# Patient Record
Sex: FEMALE | Race: WHITE | Employment: OTHER | URBAN - METROPOLITAN AREA
[De-identification: names, ages, dates, MRNs, and addresses within clinical notes are randomized per-mention and may not be internally consistent; named-entity substitution may affect disease eponyms.]

---

## 2022-05-11 ENCOUNTER — OFFICE VISIT (OUTPATIENT)
Dept: FAMILY MEDICINE CLINIC | Facility: CLINIC | Age: 57
End: 2022-05-11
Payer: COMMERCIAL

## 2022-05-11 ENCOUNTER — LAB ENCOUNTER (OUTPATIENT)
Dept: LAB | Facility: HOSPITAL | Age: 57
End: 2022-05-11
Attending: NURSE PRACTITIONER
Payer: COMMERCIAL

## 2022-05-11 ENCOUNTER — TELEPHONE (OUTPATIENT)
Dept: FAMILY MEDICINE CLINIC | Facility: CLINIC | Age: 57
End: 2022-05-11

## 2022-05-11 ENCOUNTER — HOSPITAL ENCOUNTER (OUTPATIENT)
Dept: GENERAL RADIOLOGY | Facility: HOSPITAL | Age: 57
Discharge: HOME | End: 2022-05-11
Attending: NURSE PRACTITIONER
Payer: COMMERCIAL

## 2022-05-11 VITALS
HEIGHT: 67 IN | OXYGEN SATURATION: 100 % | WEIGHT: 191 LBS | HEART RATE: 80 BPM | SYSTOLIC BLOOD PRESSURE: 110 MMHG | BODY MASS INDEX: 29.98 KG/M2 | DIASTOLIC BLOOD PRESSURE: 70 MMHG | TEMPERATURE: 97 F

## 2022-05-11 DIAGNOSIS — E03.8 OTHER SPECIFIED HYPOTHYROIDISM: ICD-10-CM

## 2022-05-11 DIAGNOSIS — Z98.84 S/P GASTRIC BYPASS: ICD-10-CM

## 2022-05-11 DIAGNOSIS — K44.9 HIATAL HERNIA WITH GASTROESOPHAGEAL REFLUX DISEASE AND ESOPHAGITIS: ICD-10-CM

## 2022-05-11 DIAGNOSIS — I25.10 CORONARY ARTERY DISEASE INVOLVING NATIVE CORONARY ARTERY OF NATIVE HEART WITHOUT ANGINA PECTORIS: ICD-10-CM

## 2022-05-11 DIAGNOSIS — Z01.812 PRE-OPERATIVE LABORATORY EXAMINATION: ICD-10-CM

## 2022-05-11 DIAGNOSIS — Z87.891 HISTORY OF TOBACCO ABUSE: ICD-10-CM

## 2022-05-11 DIAGNOSIS — I77.810 AORTIC ROOT DILATATION (HCC): ICD-10-CM

## 2022-05-11 DIAGNOSIS — Z01.818 PREOP EXAMINATION: ICD-10-CM

## 2022-05-11 DIAGNOSIS — I10 ESSENTIAL HYPERTENSION: ICD-10-CM

## 2022-05-11 DIAGNOSIS — M53.3 CHRONIC LEFT SI JOINT PAIN: ICD-10-CM

## 2022-05-11 DIAGNOSIS — Z01.812 PRE-OPERATIVE LABORATORY EXAMINATION: Primary | ICD-10-CM

## 2022-05-11 DIAGNOSIS — G89.29 CHRONIC LEFT SI JOINT PAIN: ICD-10-CM

## 2022-05-11 DIAGNOSIS — G89.4 CHRONIC PAIN SYNDROME: ICD-10-CM

## 2022-05-11 DIAGNOSIS — K21.00 HIATAL HERNIA WITH GASTROESOPHAGEAL REFLUX DISEASE AND ESOPHAGITIS: ICD-10-CM

## 2022-05-11 PROBLEM — M79.7 FIBROMYALGIA: Status: ACTIVE | Noted: 2022-05-11

## 2022-05-11 LAB
ALBUMIN SERPL-MCNC: 4.2 G/DL (ref 3.4–5)
ALBUMIN/GLOB SERPL: 1.2 {RATIO} (ref 1–2)
ALP LIVER SERPL-CCNC: 43 U/L
ALT SERPL-CCNC: 29 U/L
ANION GAP SERPL CALC-SCNC: 7 MMOL/L (ref 0–18)
ANTIBODY SCREEN: NEGATIVE
APTT PPP: 26.2 SECONDS (ref 23.3–35.6)
AST SERPL-CCNC: 21 U/L (ref 15–37)
BASOPHILS # BLD AUTO: 0.03 X10(3) UL (ref 0–0.2)
BASOPHILS NFR BLD AUTO: 0.4 %
BILIRUB SERPL-MCNC: 0.5 MG/DL (ref 0.1–2)
BILIRUB UR QL: NEGATIVE
BUN BLD-MCNC: 10 MG/DL (ref 7–18)
BUN/CREAT SERPL: 14.3 (ref 10–20)
CALCIUM BLD-MCNC: 9.2 MG/DL (ref 8.5–10.1)
CHLORIDE SERPL-SCNC: 106 MMOL/L (ref 98–112)
CO2 SERPL-SCNC: 25 MMOL/L (ref 21–32)
COLOR UR: YELLOW
CREAT BLD-MCNC: 0.7 MG/DL
DEPRECATED RDW RBC AUTO: 44.6 FL (ref 35.1–46.3)
EOSINOPHIL # BLD AUTO: 0.23 X10(3) UL (ref 0–0.7)
EOSINOPHIL NFR BLD AUTO: 3.2 %
ERYTHROCYTE [DISTWIDTH] IN BLOOD BY AUTOMATED COUNT: 12.2 % (ref 11–15)
FASTING STATUS PATIENT QL REPORTED: YES
GLOBULIN PLAS-MCNC: 3.6 G/DL (ref 2.8–4.4)
GLUCOSE BLD-MCNC: 83 MG/DL (ref 70–99)
GLUCOSE UR-MCNC: NEGATIVE MG/DL
HCT VFR BLD AUTO: 42.6 %
HGB BLD-MCNC: 13.8 G/DL
HGB UR QL STRIP.AUTO: NEGATIVE
IMM GRANULOCYTES # BLD AUTO: 0.02 X10(3) UL (ref 0–1)
IMM GRANULOCYTES NFR BLD: 0.3 %
INR BLD: 0.94 (ref 0.8–1.2)
KETONES UR-MCNC: NEGATIVE MG/DL
LYMPHOCYTES # BLD AUTO: 1.79 X10(3) UL (ref 1–4)
LYMPHOCYTES NFR BLD AUTO: 24.8 %
MCH RBC QN AUTO: 31.7 PG (ref 26–34)
MCHC RBC AUTO-ENTMCNC: 32.4 G/DL (ref 31–37)
MCV RBC AUTO: 97.9 FL
MONOCYTES # BLD AUTO: 0.4 X10(3) UL (ref 0.1–1)
MONOCYTES NFR BLD AUTO: 5.5 %
NEUTROPHILS # BLD AUTO: 4.74 X10 (3) UL (ref 1.5–7.7)
NEUTROPHILS # BLD AUTO: 4.74 X10(3) UL (ref 1.5–7.7)
NEUTROPHILS NFR BLD AUTO: 65.8 %
NITRITE UR QL STRIP.AUTO: NEGATIVE
OSMOLALITY SERPL CALC.SUM OF ELEC: 284 MOSM/KG (ref 275–295)
PH UR: 7 [PH] (ref 5–8)
PLATELET # BLD AUTO: 256 10(3)UL (ref 150–450)
POTASSIUM SERPL-SCNC: 3.9 MMOL/L (ref 3.5–5.1)
PROT SERPL-MCNC: 7.8 G/DL (ref 6.4–8.2)
PROT UR-MCNC: NEGATIVE MG/DL
PROTHROMBIN TIME: 12.7 SECONDS (ref 11.6–14.8)
RBC # BLD AUTO: 4.35 X10(6)UL
RH BLOOD TYPE: POSITIVE
RH BLOOD TYPE: POSITIVE
SODIUM SERPL-SCNC: 138 MMOL/L (ref 136–145)
SP GR UR STRIP: 1.01 (ref 1–1.03)
UROBILINOGEN UR STRIP-ACNC: <2
VIT C UR-MCNC: NEGATIVE MG/DL
WBC # BLD AUTO: 7.2 X10(3) UL (ref 4–11)
WBC #/AREA URNS AUTO: >50 /HPF

## 2022-05-11 PROCEDURE — 99203 OFFICE O/P NEW LOW 30 MIN: CPT | Performed by: FAMILY MEDICINE

## 2022-05-11 PROCEDURE — 86850 RBC ANTIBODY SCREEN: CPT

## 2022-05-11 PROCEDURE — 36415 COLL VENOUS BLD VENIPUNCTURE: CPT

## 2022-05-11 PROCEDURE — 3008F BODY MASS INDEX DOCD: CPT | Performed by: FAMILY MEDICINE

## 2022-05-11 PROCEDURE — 81001 URINALYSIS AUTO W/SCOPE: CPT

## 2022-05-11 PROCEDURE — 3074F SYST BP LT 130 MM HG: CPT | Performed by: FAMILY MEDICINE

## 2022-05-11 PROCEDURE — 80053 COMPREHEN METABOLIC PANEL: CPT

## 2022-05-11 PROCEDURE — 85610 PROTHROMBIN TIME: CPT

## 2022-05-11 PROCEDURE — 87086 URINE CULTURE/COLONY COUNT: CPT

## 2022-05-11 PROCEDURE — 71046 X-RAY EXAM CHEST 2 VIEWS: CPT | Performed by: NURSE PRACTITIONER

## 2022-05-11 PROCEDURE — 93010 ELECTROCARDIOGRAM REPORT: CPT | Performed by: NURSE PRACTITIONER

## 2022-05-11 PROCEDURE — 86900 BLOOD TYPING SEROLOGIC ABO: CPT

## 2022-05-11 PROCEDURE — 85025 COMPLETE CBC W/AUTO DIFF WBC: CPT

## 2022-05-11 PROCEDURE — 87081 CULTURE SCREEN ONLY: CPT

## 2022-05-11 PROCEDURE — 93005 ELECTROCARDIOGRAM TRACING: CPT

## 2022-05-11 PROCEDURE — 85730 THROMBOPLASTIN TIME PARTIAL: CPT

## 2022-05-11 PROCEDURE — 86901 BLOOD TYPING SEROLOGIC RH(D): CPT

## 2022-05-11 PROCEDURE — 3078F DIAST BP <80 MM HG: CPT | Performed by: FAMILY MEDICINE

## 2022-05-11 RX ORDER — DOCUSATE SODIUM 100 MG/1
100 CAPSULE, LIQUID FILLED ORAL DAILY PRN
COMMUNITY

## 2022-05-11 RX ORDER — LIOTHYRONINE SODIUM 5 UG/1
5 TABLET ORAL DAILY
COMMUNITY

## 2022-05-11 RX ORDER — METHOCARBAMOL 750 MG/1
750 TABLET, FILM COATED ORAL 2 TIMES DAILY
COMMUNITY

## 2022-05-11 RX ORDER — HYDROCODONE BITARTRATE AND ACETAMINOPHEN 10; 325 MG/1; MG/1
1 TABLET ORAL EVERY 6 HOURS PRN
COMMUNITY

## 2022-05-11 RX ORDER — MULTIVIT-MIN/IRON FUM/FOLIC AC 7.5 MG-4
1 TABLET ORAL DAILY
COMMUNITY

## 2022-05-11 RX ORDER — NEOMYCIN/POLYMYXIN B/PRAMOXINE 3.5-10K-1
3 CREAM (GRAM) TOPICAL DAILY
COMMUNITY

## 2022-05-11 RX ORDER — MONTELUKAST SODIUM 10 MG/1
10 TABLET ORAL NIGHTLY
COMMUNITY

## 2022-05-11 RX ORDER — CETIRIZINE HYDROCHLORIDE 10 MG/1
10 TABLET ORAL DAILY
COMMUNITY

## 2022-05-11 RX ORDER — SPIRONOLACTONE 100 MG/1
100 TABLET, FILM COATED ORAL DAILY
COMMUNITY

## 2022-05-11 RX ORDER — AMOXICILLIN 500 MG/1
CAPSULE ORAL
COMMUNITY

## 2022-05-11 RX ORDER — FAMOTIDINE 20 MG/1
20 TABLET, FILM COATED ORAL DAILY
COMMUNITY

## 2022-05-11 RX ORDER — LEVOTHYROXINE SODIUM 75 MCG
75 TABLET ORAL
COMMUNITY

## 2022-05-11 RX ORDER — AMLODIPINE BESYLATE 5 MG/1
7.5 TABLET ORAL DAILY
COMMUNITY

## 2022-05-11 NOTE — TELEPHONE ENCOUNTER
Surgery on 05/23/22, Left Sacroilliac Joint Fusion with Dr. Eve Toro @ [facility]    H&P- complete 05/11/22  Labs- Urine culture neg, MRSA neg, Alk Phos (43), all other labs WNL  EKG- Sinus bradycardia, low voltage-possible pulmonary disease  X-ray- WN    Cardiologist- Dr. Rishabh San  [phone]  [phone]  Appt 05/16/22  Cardiac Clearance sent to scanning 05/18/22  \"EKG is stable with no acute changes. Cardiac testing: There is no evidence of unstable coronary syndrome, decompensated heart failure, critical valvular heart disease, or electrical instability. Risk of Cardiovascular complications in non-prohibitive. Patient understands that no immunity from risk exists\"    Pain Management Dr. Oliver Michelle  [phone]  He is aware patient is having outpatient surgery and sent in pain meds for patient post-op for Norco Q4 then to taper, also Robaxin 2 tabs PRN    Spoke to PAT- patient requested US guided IV insertion due to being a difficult start. They made a note in her chart and patient will remind them before they start IV to get the machine.

## 2022-05-18 NOTE — TELEPHONE ENCOUNTER
Dr. Marika Duran, please review:    Labs- Urine culture neg, MRSA neg, Alk Phos (43), all other labs WNL  EKG- Sinus bradycardia, low voltage-possible pulmonary disease  X-ray- WNL    Cardiologist- Dr. Irma Heath  [phone]  [phone]  Appt 05/16/22  Cardiac Clearance scanned into [facility] 05/19/22  \"EKG is stable with no acute changes. Cardiac testing: There is no evidence of unstable coronary syndrome, decompensated heart failure, critical valvular heart disease, or electrical instability. Risk of Cardiovascular complications in non-prohibitive. Patient understands that no immunity from risk exists\"    Pain Management Dr. Lili Vazquez  [phone]  He is aware patient is having outpatient surgery and sent in pain meds for patient post-op for Norco Q4 then to taper, also Robaxin 2 tabs PRN    Spoke to PAT- patient requested US guided IV insertion due to being a difficult start. They made a note in her chart and patient will remind them before they start IV to get the machine. OK for surgery?

## 2022-05-20 ENCOUNTER — LAB ENCOUNTER (OUTPATIENT)
Dept: LAB | Facility: HOSPITAL | Age: 57
End: 2022-05-20
Attending: ORTHOPAEDIC SURGERY
Payer: COMMERCIAL

## 2022-05-20 DIAGNOSIS — Z01.818 PRE-OP TESTING: ICD-10-CM

## 2022-05-20 NOTE — TELEPHONE ENCOUNTER
Cardiac clearance noted. Chart and results reviewed and are acceptable for surgery. Pt is medically clear to proceed with surgery as planned.

## 2022-05-20 NOTE — TELEPHONE ENCOUNTER
Left message for patient with test results and that we received Cardiac Clx and it is scanned into her chart. She is clear for surgery and should call back if she has any questions. She will need to make sure she gets COVID test done.

## 2022-05-21 LAB — SARS-COV-2 RNA RESP QL NAA+PROBE: DETECTED

## 2022-06-20 NOTE — PAT NURSING NOTE
Patient states she had stuffy nose, sniffles when she tested positive for COVID om 05/21/22. She currently denies any current s/s of COVID.

## 2022-06-21 ENCOUNTER — LAB ENCOUNTER (OUTPATIENT)
Dept: LAB | Facility: HOSPITAL | Age: 57
End: 2022-06-21
Attending: ORTHOPAEDIC SURGERY
Payer: COMMERCIAL

## 2022-06-21 DIAGNOSIS — Z01.818 PREOP TESTING: ICD-10-CM

## 2022-06-21 DIAGNOSIS — Z01.810 PREOP CARDIOVASCULAR EXAM: ICD-10-CM

## 2022-06-21 LAB
ANTIBODY SCREEN: NEGATIVE
RH BLOOD TYPE: POSITIVE

## 2022-06-21 PROCEDURE — 86850 RBC ANTIBODY SCREEN: CPT

## 2022-06-21 PROCEDURE — 86900 BLOOD TYPING SEROLOGIC ABO: CPT

## 2022-06-21 PROCEDURE — 87641 MR-STAPH DNA AMP PROBE: CPT

## 2022-06-21 PROCEDURE — 86901 BLOOD TYPING SEROLOGIC RH(D): CPT

## 2022-06-22 LAB — MRSA DNA SPEC QL NAA+PROBE: NEGATIVE

## 2022-06-24 ENCOUNTER — HOSPITAL ENCOUNTER (OUTPATIENT)
Facility: HOSPITAL | Age: 57
LOS: 1 days | Discharge: HOME | End: 2022-06-25
Attending: ORTHOPAEDIC SURGERY | Admitting: ORTHOPAEDIC SURGERY
Payer: COMMERCIAL

## 2022-06-24 ENCOUNTER — APPOINTMENT (OUTPATIENT)
Dept: GENERAL RADIOLOGY | Facility: HOSPITAL | Age: 57
End: 2022-06-24
Attending: ORTHOPAEDIC SURGERY
Payer: COMMERCIAL

## 2022-06-24 ENCOUNTER — ANESTHESIA EVENT (OUTPATIENT)
Dept: SURGERY | Facility: HOSPITAL | Age: 57
End: 2022-06-24
Payer: COMMERCIAL

## 2022-06-24 ENCOUNTER — ANESTHESIA (OUTPATIENT)
Dept: SURGERY | Facility: HOSPITAL | Age: 57
End: 2022-06-24
Payer: COMMERCIAL

## 2022-06-24 DIAGNOSIS — Z01.818 PREOP TESTING: ICD-10-CM

## 2022-06-24 DIAGNOSIS — Z01.810 PREOP CARDIOVASCULAR EXAM: Primary | ICD-10-CM

## 2022-06-24 PROCEDURE — 76000 FLUOROSCOPY <1 HR PHYS/QHP: CPT | Performed by: ORTHOPAEDIC SURGERY

## 2022-06-24 PROCEDURE — BW11YZZ FLUOROSCOPY OF ABDOMEN AND PELVIS USING OTHER CONTRAST: ICD-10-PCS | Performed by: RADIOLOGY

## 2022-06-24 PROCEDURE — 0SG834Z FUSION OF LEFT SACROILIAC JOINT WITH INTERNAL FIXATION DEVICE, PERCUTANEOUS APPROACH: ICD-10-PCS | Performed by: ORTHOPAEDIC SURGERY

## 2022-06-24 PROCEDURE — 99214 OFFICE O/P EST MOD 30 MIN: CPT | Performed by: HOSPITALIST

## 2022-06-24 DEVICE — IMPLANTABLE DEVICE
Type: IMPLANTABLE DEVICE | Site: BACK | Status: FUNCTIONAL
Brand: IFUSE IMPLANT SYSTEM

## 2022-06-24 RX ORDER — NALOXONE HYDROCHLORIDE 0.4 MG/ML
80 INJECTION, SOLUTION INTRAMUSCULAR; INTRAVENOUS; SUBCUTANEOUS AS NEEDED
Status: DISCONTINUED | OUTPATIENT
Start: 2022-06-24 | End: 2022-06-24 | Stop reason: HOSPADM

## 2022-06-24 RX ORDER — NEOSTIGMINE METHYLSULFATE 1 MG/ML
INJECTION INTRAVENOUS AS NEEDED
Status: DISCONTINUED | OUTPATIENT
Start: 2022-06-24 | End: 2022-06-24 | Stop reason: SURG

## 2022-06-24 RX ORDER — LEVOTHYROXINE SODIUM 0.07 MG/1
75 TABLET ORAL
Status: DISCONTINUED | OUTPATIENT
Start: 2022-06-25 | End: 2022-06-25

## 2022-06-24 RX ORDER — POLYETHYLENE GLYCOL 3350 17 G/17G
17 POWDER, FOR SOLUTION ORAL DAILY PRN
Status: DISCONTINUED | OUTPATIENT
Start: 2022-06-24 | End: 2022-06-25

## 2022-06-24 RX ORDER — HYDROCODONE BITARTRATE AND ACETAMINOPHEN 10; 325 MG/1; MG/1
2 TABLET ORAL EVERY 4 HOURS PRN
Status: DISCONTINUED | OUTPATIENT
Start: 2022-06-24 | End: 2022-06-25

## 2022-06-24 RX ORDER — METHOCARBAMOL 750 MG/1
750 TABLET, FILM COATED ORAL 2 TIMES DAILY PRN
Status: DISCONTINUED | OUTPATIENT
Start: 2022-06-24 | End: 2022-06-25

## 2022-06-24 RX ORDER — ONDANSETRON 2 MG/ML
4 INJECTION INTRAMUSCULAR; INTRAVENOUS EVERY 4 HOURS PRN
Status: ACTIVE | OUTPATIENT
Start: 2022-06-24 | End: 2022-06-25

## 2022-06-24 RX ORDER — CEFAZOLIN SODIUM/WATER 2 G/20 ML
2 SYRINGE (ML) INTRAVENOUS ONCE
Status: COMPLETED | OUTPATIENT
Start: 2022-06-24 | End: 2022-06-24

## 2022-06-24 RX ORDER — ACETAMINOPHEN 325 MG/1
650 TABLET ORAL EVERY 4 HOURS PRN
Status: DISCONTINUED | OUTPATIENT
Start: 2022-06-24 | End: 2022-06-25

## 2022-06-24 RX ORDER — MIDAZOLAM HYDROCHLORIDE 1 MG/ML
INJECTION INTRAMUSCULAR; INTRAVENOUS AS NEEDED
Status: DISCONTINUED | OUTPATIENT
Start: 2022-06-24 | End: 2022-06-24 | Stop reason: SURG

## 2022-06-24 RX ORDER — DIPHENHYDRAMINE HYDROCHLORIDE 50 MG/ML
25 INJECTION INTRAMUSCULAR; INTRAVENOUS EVERY 4 HOURS PRN
Status: DISCONTINUED | OUTPATIENT
Start: 2022-06-24 | End: 2022-06-25

## 2022-06-24 RX ORDER — DIPHENHYDRAMINE HCL 25 MG
25 CAPSULE ORAL EVERY 4 HOURS PRN
Status: DISCONTINUED | OUTPATIENT
Start: 2022-06-24 | End: 2022-06-25

## 2022-06-24 RX ORDER — BUPIVACAINE HYDROCHLORIDE AND EPINEPHRINE 5; 5 MG/ML; UG/ML
INJECTION, SOLUTION PERINEURAL AS NEEDED
Status: DISCONTINUED | OUTPATIENT
Start: 2022-06-24 | End: 2022-06-24 | Stop reason: HOSPADM

## 2022-06-24 RX ORDER — ACETAMINOPHEN 500 MG
1000 TABLET ORAL ONCE
Status: DISCONTINUED | OUTPATIENT
Start: 2022-06-24 | End: 2022-06-24 | Stop reason: HOSPADM

## 2022-06-24 RX ORDER — SODIUM PHOSPHATE, DIBASIC AND SODIUM PHOSPHATE, MONOBASIC 7; 19 G/133ML; G/133ML
1 ENEMA RECTAL ONCE AS NEEDED
Status: DISCONTINUED | OUTPATIENT
Start: 2022-06-24 | End: 2022-06-25

## 2022-06-24 RX ORDER — HYDROCODONE BITARTRATE AND ACETAMINOPHEN 10; 325 MG/1; MG/1
1 TABLET ORAL EVERY 4 HOURS PRN
Status: DISCONTINUED | OUTPATIENT
Start: 2022-06-24 | End: 2022-06-25

## 2022-06-24 RX ORDER — HYDROMORPHONE HYDROCHLORIDE 1 MG/ML
0.6 INJECTION, SOLUTION INTRAMUSCULAR; INTRAVENOUS; SUBCUTANEOUS EVERY 5 MIN PRN
Status: DISCONTINUED | OUTPATIENT
Start: 2022-06-24 | End: 2022-06-24 | Stop reason: HOSPADM

## 2022-06-24 RX ORDER — HYDROMORPHONE HYDROCHLORIDE 1 MG/ML
0.2 INJECTION, SOLUTION INTRAMUSCULAR; INTRAVENOUS; SUBCUTANEOUS EVERY 2 HOUR PRN
Status: DISCONTINUED | OUTPATIENT
Start: 2022-06-24 | End: 2022-06-25

## 2022-06-24 RX ORDER — GLYCOPYRROLATE 0.2 MG/ML
INJECTION, SOLUTION INTRAMUSCULAR; INTRAVENOUS AS NEEDED
Status: DISCONTINUED | OUTPATIENT
Start: 2022-06-24 | End: 2022-06-24 | Stop reason: SURG

## 2022-06-24 RX ORDER — PROCHLORPERAZINE EDISYLATE 5 MG/ML
10 INJECTION INTRAMUSCULAR; INTRAVENOUS EVERY 6 HOURS PRN
Status: DISCONTINUED | OUTPATIENT
Start: 2022-06-24 | End: 2022-06-25

## 2022-06-24 RX ORDER — HYDROMORPHONE HYDROCHLORIDE 1 MG/ML
0.8 INJECTION, SOLUTION INTRAMUSCULAR; INTRAVENOUS; SUBCUTANEOUS EVERY 2 HOUR PRN
Status: DISCONTINUED | OUTPATIENT
Start: 2022-06-24 | End: 2022-06-25

## 2022-06-24 RX ORDER — SENNOSIDES 8.6 MG
17.2 TABLET ORAL NIGHTLY
Status: DISCONTINUED | OUTPATIENT
Start: 2022-06-24 | End: 2022-06-25

## 2022-06-24 RX ORDER — LIOTHYRONINE SODIUM 5 UG/1
5 TABLET ORAL DAILY
Status: DISCONTINUED | OUTPATIENT
Start: 2022-06-25 | End: 2022-06-25

## 2022-06-24 RX ORDER — HYDROMORPHONE HYDROCHLORIDE 1 MG/ML
0.4 INJECTION, SOLUTION INTRAMUSCULAR; INTRAVENOUS; SUBCUTANEOUS EVERY 5 MIN PRN
Status: DISCONTINUED | OUTPATIENT
Start: 2022-06-24 | End: 2022-06-24 | Stop reason: HOSPADM

## 2022-06-24 RX ORDER — CETIRIZINE HYDROCHLORIDE 10 MG/1
10 TABLET ORAL DAILY
Status: DISCONTINUED | OUTPATIENT
Start: 2022-06-25 | End: 2022-06-25

## 2022-06-24 RX ORDER — FAMOTIDINE 20 MG/1
20 TABLET, FILM COATED ORAL DAILY
Status: DISCONTINUED | OUTPATIENT
Start: 2022-06-24 | End: 2022-06-25

## 2022-06-24 RX ORDER — SPIRONOLACTONE 25 MG/1
100 TABLET ORAL DAILY
Status: DISCONTINUED | OUTPATIENT
Start: 2022-06-25 | End: 2022-06-25

## 2022-06-24 RX ORDER — LIDOCAINE HYDROCHLORIDE 40 MG/ML
SOLUTION TOPICAL AS NEEDED
Status: DISCONTINUED | OUTPATIENT
Start: 2022-06-24 | End: 2022-06-24 | Stop reason: SURG

## 2022-06-24 RX ORDER — BISACODYL 10 MG
10 SUPPOSITORY, RECTAL RECTAL
Status: DISCONTINUED | OUTPATIENT
Start: 2022-06-24 | End: 2022-06-25

## 2022-06-24 RX ORDER — SODIUM CHLORIDE, SODIUM LACTATE, POTASSIUM CHLORIDE, CALCIUM CHLORIDE 600; 310; 30; 20 MG/100ML; MG/100ML; MG/100ML; MG/100ML
INJECTION, SOLUTION INTRAVENOUS CONTINUOUS
Status: DISCONTINUED | OUTPATIENT
Start: 2022-06-24 | End: 2022-06-24 | Stop reason: HOSPADM

## 2022-06-24 RX ORDER — MORPHINE SULFATE 10 MG/ML
6 INJECTION, SOLUTION INTRAMUSCULAR; INTRAVENOUS EVERY 10 MIN PRN
Status: DISCONTINUED | OUTPATIENT
Start: 2022-06-24 | End: 2022-06-24 | Stop reason: HOSPADM

## 2022-06-24 RX ORDER — DOCUSATE SODIUM 100 MG/1
100 CAPSULE, LIQUID FILLED ORAL 2 TIMES DAILY
Status: DISCONTINUED | OUTPATIENT
Start: 2022-06-24 | End: 2022-06-25

## 2022-06-24 RX ORDER — MORPHINE SULFATE 4 MG/ML
2 INJECTION, SOLUTION INTRAMUSCULAR; INTRAVENOUS EVERY 10 MIN PRN
Status: DISCONTINUED | OUTPATIENT
Start: 2022-06-24 | End: 2022-06-24 | Stop reason: HOSPADM

## 2022-06-24 RX ORDER — DOCUSATE SODIUM 100 MG/1
100 CAPSULE, LIQUID FILLED ORAL DAILY PRN
Status: DISCONTINUED | OUTPATIENT
Start: 2022-06-24 | End: 2022-06-25

## 2022-06-24 RX ORDER — SODIUM CHLORIDE, SODIUM LACTATE, POTASSIUM CHLORIDE, CALCIUM CHLORIDE 600; 310; 30; 20 MG/100ML; MG/100ML; MG/100ML; MG/100ML
INJECTION, SOLUTION INTRAVENOUS CONTINUOUS
Status: DISCONTINUED | OUTPATIENT
Start: 2022-06-24 | End: 2022-06-25

## 2022-06-24 RX ORDER — HYDROMORPHONE HYDROCHLORIDE 1 MG/ML
0.2 INJECTION, SOLUTION INTRAMUSCULAR; INTRAVENOUS; SUBCUTANEOUS EVERY 5 MIN PRN
Status: DISCONTINUED | OUTPATIENT
Start: 2022-06-24 | End: 2022-06-24 | Stop reason: HOSPADM

## 2022-06-24 RX ORDER — HYDROMORPHONE HYDROCHLORIDE 1 MG/ML
0.4 INJECTION, SOLUTION INTRAMUSCULAR; INTRAVENOUS; SUBCUTANEOUS EVERY 2 HOUR PRN
Status: DISCONTINUED | OUTPATIENT
Start: 2022-06-24 | End: 2022-06-25

## 2022-06-24 RX ORDER — TIZANIDINE 2 MG/1
2 TABLET ORAL EVERY 8 HOURS PRN
Status: DISCONTINUED | OUTPATIENT
Start: 2022-06-24 | End: 2022-06-25

## 2022-06-24 RX ORDER — DIAZEPAM 5 MG/1
5 TABLET ORAL EVERY 6 HOURS PRN
Status: DISCONTINUED | OUTPATIENT
Start: 2022-06-24 | End: 2022-06-25

## 2022-06-24 RX ORDER — ROCURONIUM BROMIDE 10 MG/ML
INJECTION, SOLUTION INTRAVENOUS AS NEEDED
Status: DISCONTINUED | OUTPATIENT
Start: 2022-06-24 | End: 2022-06-24 | Stop reason: SURG

## 2022-06-24 RX ORDER — MORPHINE SULFATE 4 MG/ML
4 INJECTION, SOLUTION INTRAMUSCULAR; INTRAVENOUS EVERY 10 MIN PRN
Status: DISCONTINUED | OUTPATIENT
Start: 2022-06-24 | End: 2022-06-24 | Stop reason: HOSPADM

## 2022-06-24 RX ORDER — CEFAZOLIN SODIUM/WATER 2 G/20 ML
2 SYRINGE (ML) INTRAVENOUS EVERY 8 HOURS
Status: COMPLETED | OUTPATIENT
Start: 2022-06-24 | End: 2022-06-25

## 2022-06-24 RX ORDER — LIDOCAINE HYDROCHLORIDE 10 MG/ML
INJECTION, SOLUTION EPIDURAL; INFILTRATION; INTRACAUDAL; PERINEURAL AS NEEDED
Status: DISCONTINUED | OUTPATIENT
Start: 2022-06-24 | End: 2022-06-24 | Stop reason: SURG

## 2022-06-24 RX ORDER — MONTELUKAST SODIUM 10 MG/1
10 TABLET ORAL NIGHTLY
Status: DISCONTINUED | OUTPATIENT
Start: 2022-06-24 | End: 2022-06-25

## 2022-06-24 RX ADMIN — NEOSTIGMINE METHYLSULFATE 5 MG: 1 INJECTION INTRAVENOUS at 08:18:00

## 2022-06-24 RX ADMIN — MIDAZOLAM HYDROCHLORIDE 2 MG: 1 INJECTION INTRAMUSCULAR; INTRAVENOUS at 07:20:00

## 2022-06-24 RX ADMIN — SODIUM CHLORIDE, SODIUM LACTATE, POTASSIUM CHLORIDE, CALCIUM CHLORIDE: 600; 310; 30; 20 INJECTION, SOLUTION INTRAVENOUS at 07:20:00

## 2022-06-24 RX ADMIN — CEFAZOLIN SODIUM/WATER 2 G: 2 G/20 ML SYRINGE (ML) INTRAVENOUS at 07:45:00

## 2022-06-24 RX ADMIN — LIDOCAINE HYDROCHLORIDE 4 ML: 40 SOLUTION TOPICAL at 07:26:00

## 2022-06-24 RX ADMIN — LIDOCAINE HYDROCHLORIDE 50 MG: 10 INJECTION, SOLUTION EPIDURAL; INFILTRATION; INTRACAUDAL; PERINEURAL at 07:20:00

## 2022-06-24 RX ADMIN — ROCURONIUM BROMIDE 40 MG: 10 INJECTION, SOLUTION INTRAVENOUS at 07:23:00

## 2022-06-24 RX ADMIN — GLYCOPYRROLATE 0.8 MG: 0.2 INJECTION, SOLUTION INTRAMUSCULAR; INTRAVENOUS at 08:18:00

## 2022-06-24 NOTE — ANESTHESIA PROCEDURE NOTES
Peripheral IV  Date/Time: 6/24/2022 7:52 AM  Inserted by: Marily Galindo MD    Placement  Needle size: 20 G  Laterality: left  Location: hand  Site prep: alcohol  Technique: anatomical landmarks  Attempts: 2

## 2022-06-24 NOTE — ANESTHESIA PROCEDURE NOTES
Airway  Date/Time: 6/24/2022 7:26 AM  Urgency: elective      General Information and Staff    Patient location during procedure: OR  Anesthesiologist: Patricia Dumas MD  Performed: anesthesiologist     Indications and Patient Condition  Indications for airway management: anesthesia  Sedation level: deep  Preoxygenated: yes  Patient position: sniffing  Mask difficulty assessment: 1 - vent by mask    Final Airway Details  Final airway type: endotracheal airway      Successful airway: ETT  Cuffed: yes   Successful intubation technique: direct laryngoscopy  Endotracheal tube insertion site: oral  Blade: James  Blade size: #3  ETT size (mm): 7.0    Cormack-Lehane Classification: grade I - full view of glottis  Placement verified by: chest auscultation and capnometry   Measured from: lips  ETT to lips (cm): 21  Number of attempts at approach: 1    Additional Comments  Atraumatic x1

## 2022-06-24 NOTE — PLAN OF CARE
Pt is A&OX4, RA, patient has SCDs, patient pain being controlled with norco and valium. Patient is up with one and rolling walker. Patient voiding freely. Tolerating diet well. Plan is to go home tomorrow after clearances have been met. Patient stayed the day for pain management. Bed in lowest position and call light within reach.   Problem: Patient Centered Care  Goal: Patient preferences are identified and integrated in the patient's plan of care  Description: Interventions:  - What would you like us to know as we care for you?   - Provide timely, complete, and accurate information to patient/family  - Incorporate patient and family knowledge, values, beliefs, and cultural backgrounds into the planning and delivery of care  - Encourage patient/family to participate in care and decision-making at the level they choose  - Honor patient and family perspectives and choices  Outcome: Progressing     Problem: Patient/Family Goals  Goal: Patient/Family Long Term Goal  Description: Patient's Long Term Goal:     Interventions:  -   - See additional Care Plan goals for specific interventions  Outcome: Progressing  Goal: Patient/Family Short Term Goal  Description: Patient's Short Term Goal:     Interventions:   -   - See additional Care Plan goals for specific interventions  Outcome: Progressing     Problem: PAIN - ADULT  Goal: Verbalizes/displays adequate comfort level or patient's stated pain goal  Description: INTERVENTIONS:  - Encourage pt to monitor pain and request assistance  - Assess pain using appropriate pain scale  - Administer analgesics based on type and severity of pain and evaluate response  - Implement non-pharmacological measures as appropriate and evaluate response  - Consider cultural and social influences on pain and pain management  - Manage/alleviate anxiety  - Utilize distraction and/or relaxation techniques  - Monitor for opioid side effects  - Notify MD/LIP if interventions unsuccessful or patient reports new pain  - Anticipate increased pain with activity and pre-medicate as appropriate  Outcome: Progressing     Problem: RISK FOR INFECTION - ADULT  Goal: Absence of fever/infection during anticipated neutropenic period  Description: INTERVENTIONS  - Monitor WBC  - Administer growth factors as ordered  - Implement neutropenic guidelines  Outcome: Progressing     Problem: SAFETY ADULT - FALL  Goal: Free from fall injury  Description: INTERVENTIONS:  - Assess pt frequently for physical needs  - Identify cognitive and physical deficits and behaviors that affect risk of falls.   - [place] fall precautions as indicated by assessment.  - Educate pt/family on patient safety including physical limitations  - Instruct pt to call for assistance with activity based on assessment  - Modify environment to reduce risk of injury  - Provide assistive devices as appropriate  - Consider OT/PT consult to assist with strengthening/mobility  - Encourage toileting schedule  Outcome: Progressing     Problem: DISCHARGE PLANNING  Goal: Discharge to home or other facility with appropriate resources  Description: INTERVENTIONS:  - Identify barriers to discharge w/pt and caregiver  - Include patient/family/discharge partner in discharge planning  - Arrange for needed discharge resources and transportation as appropriate  - Identify discharge learning needs (meds, wound care, etc)  - Arrange for interpreters to assist at discharge as needed  - Consider post-discharge preferences of patient/family/discharge partner  - Complete POLST form as appropriate  - Assess patient's ability to be responsible for managing their own health  - Refer to Case Management Department for coordinating discharge planning if the patient needs post-hospital services based on physician/LIP order or complex needs related to functional status, cognitive ability or social support system  Outcome: Progressing

## 2022-06-25 ENCOUNTER — APPOINTMENT (OUTPATIENT)
Dept: GENERAL RADIOLOGY | Facility: HOSPITAL | Age: 57
End: 2022-06-25
Attending: PHYSICIAN ASSISTANT
Payer: COMMERCIAL

## 2022-06-25 VITALS
DIASTOLIC BLOOD PRESSURE: 66 MMHG | WEIGHT: 193 LBS | RESPIRATION RATE: 18 BRPM | HEIGHT: 67 IN | BODY MASS INDEX: 30.29 KG/M2 | HEART RATE: 63 BPM | OXYGEN SATURATION: 100 % | TEMPERATURE: 98 F | SYSTOLIC BLOOD PRESSURE: 105 MMHG

## 2022-06-25 LAB
DEPRECATED RDW RBC AUTO: 42.4 FL (ref 35.1–46.3)
ERYTHROCYTE [DISTWIDTH] IN BLOOD BY AUTOMATED COUNT: 11.9 % (ref 11–15)
HCT VFR BLD AUTO: 35 %
HGB BLD-MCNC: 11.5 G/DL
MCH RBC QN AUTO: 32 PG (ref 26–34)
MCHC RBC AUTO-ENTMCNC: 32.9 G/DL (ref 31–37)
MCV RBC AUTO: 97.5 FL
PLATELET # BLD AUTO: 209 10(3)UL (ref 150–450)
RBC # BLD AUTO: 3.59 X10(6)UL
WBC # BLD AUTO: 9.1 X10(3) UL (ref 4–11)

## 2022-06-25 PROCEDURE — 72100 X-RAY EXAM L-S SPINE 2/3 VWS: CPT | Performed by: PHYSICIAN ASSISTANT

## 2022-06-25 PROCEDURE — 99214 OFFICE O/P EST MOD 30 MIN: CPT | Performed by: HOSPITALIST

## 2022-06-25 RX ORDER — LIOTHYRONINE SODIUM 5 UG/1
5 TABLET ORAL
Status: DISCONTINUED | OUTPATIENT
Start: 2022-06-25 | End: 2022-06-25

## 2022-06-25 NOTE — PLAN OF CARE
Patient is alert and oriented. RA. SCDs on for DVT prophylaxis. Voiding freely. BM this AM. Dressing in place to left hip. Up x1 with walker. Patient NWB to LLE per her reports from MD, educated patient that she is WBAT. PRN norco and robaxin given for pain management. Tele in place. Right arm precautions. Gel ice packs. Call light within reach, bed alarm active.    Problem: Patient Centered Care  Goal: Patient preferences are identified and integrated in the patient's plan of care  Description: Interventions:  - Provide timely, complete, and accurate information to patient/family  - Incorporate patient and family knowledge, values, beliefs, and cultural backgrounds into the planning and delivery of care  - Encourage patient/family to participate in care and decision-making at the level they choose  - Honor patient and family perspectives and choices  Outcome: Progressing     Problem: PAIN - ADULT  Goal: Verbalizes/displays adequate comfort level or patient's stated pain goal  Description: INTERVENTIONS:  - Encourage pt to monitor pain and request assistance  - Assess pain using appropriate pain scale  - Administer analgesics based on type and severity of pain and evaluate response  - Implement non-pharmacological measures as appropriate and evaluate response  - Consider cultural and social influences on pain and pain management  - Manage/alleviate anxiety  - Utilize distraction and/or relaxation techniques  - Monitor for opioid side effects  - Notify MD/LIP if interventions unsuccessful or patient reports new pain  - Anticipate increased pain with activity and pre-medicate as appropriate  Outcome: Progressing     Problem: RISK FOR INFECTION - ADULT  Goal: Absence of fever/infection during anticipated neutropenic period  Description: INTERVENTIONS  - Monitor WBC  - Administer growth factors as ordered  - Implement neutropenic guidelines  Outcome: Progressing     Problem: SAFETY ADULT - FALL  Goal: Free from fall injury  Description: INTERVENTIONS:  - Assess pt frequently for physical needs  - Identify cognitive and physical deficits and behaviors that affect risk of falls.   - [place] fall precautions as indicated by assessment.  - Educate pt/family on patient safety including physical limitations  - Instruct pt to call for assistance with activity based on assessment  - Modify environment to reduce risk of injury  - Provide assistive devices as appropriate  - Consider OT/PT consult to assist with strengthening/mobility  - Encourage toileting schedule  Outcome: Progressing     Problem: DISCHARGE PLANNING  Goal: Discharge to home or other facility with appropriate resources  Description: INTERVENTIONS:  - Identify barriers to discharge w/pt and caregiver  - Include patient/family/discharge partner in discharge planning  - Arrange for needed discharge resources and transportation as appropriate  - Identify discharge learning needs (meds, wound care, etc)  - Arrange for interpreters to assist at discharge as needed  - Consider post-discharge preferences of patient/family/discharge partner  - Complete POLST form as appropriate  - Assess patient's ability to be responsible for managing their own health  - Refer to Case Management Department for coordinating discharge planning if the patient needs post-hospital services based on physician/LIP order or complex needs related to functional status, cognitive ability or social support system  Outcome: Progressing

## 2022-06-25 NOTE — DISCHARGE SUMMARY
Hospital Discharge Diagnoses: Chronic left SI joint    Lace+ Score: 16  59-90 High Risk  29-58 Medium Risk  0-28   Low Risk. TCM Follow-Up Recommendation:  LACE < 29: Low Risk of readmission after discharge from the hospital; Still recommend for TCM follow-up.     Please refer to dictated DC summary

## 2022-06-25 NOTE — PLAN OF CARE
Patient is alert and oriented. RA. SCDs on for DVT prophylaxis. Voiding freely. BM this AM. Dressing in place to left hip. Up x1 with walker. Patient NWB to LLE per her reports from MD, educated patient that she is WBAT. PRN norco and robaxin given for pain management. Tele in place. Right arm precautions. Gel ice packs. Call light within reach, bed alarm active. patient has clearances from surgical, medical, PT/OT to discharge today. Patient has medications at home already per her pain management team.  Discharge instructions gone over with patient.    Problem: Patient Centered Care  Goal: Patient preferences are identified and integrated in the patient's plan of care  Description: Interventions:  - What would you like us to know as we care for you?   - Provide timely, complete, and accurate information to patient/family  - Incorporate patient and family knowledge, values, beliefs, and cultural backgrounds into the planning and delivery of care  - Encourage patient/family to participate in care and decision-making at the level they choose  - Honor patient and family perspectives and choices  Outcome: Adequate for Discharge     Problem: Patient/Family Goals  Goal: Patient/Family Long Term Goal  Description: Patient's Long Term Goal:     Interventions:  -   - See additional Care Plan goals for specific interventions  Outcome: Adequate for Discharge  Goal: Patient/Family Short Term Goal  Description: Patient's Short Term Goal:     Interventions:   -   - See additional Care Plan goals for specific interventions  Outcome: Adequate for Discharge     Problem: PAIN - ADULT  Goal: Verbalizes/displays adequate comfort level or patient's stated pain goal  Description: INTERVENTIONS:  - Encourage pt to monitor pain and request assistance  - Assess pain using appropriate pain scale  - Administer analgesics based on type and severity of pain and evaluate response  - Implement non-pharmacological measures as appropriate and evaluate response  - Consider cultural and social influences on pain and pain management  - Manage/alleviate anxiety  - Utilize distraction and/or relaxation techniques  - Monitor for opioid side effects  - Notify MD/LIP if interventions unsuccessful or patient reports new pain  - Anticipate increased pain with activity and pre-medicate as appropriate  Outcome: Adequate for Discharge     Problem: RISK FOR INFECTION - ADULT  Goal: Absence of fever/infection during anticipated neutropenic period  Description: INTERVENTIONS  - Monitor WBC  - Administer growth factors as ordered  - Implement neutropenic guidelines  Outcome: Adequate for Discharge     Problem: SAFETY ADULT - FALL  Goal: Free from fall injury  Description: INTERVENTIONS:  - Assess pt frequently for physical needs  - Identify cognitive and physical deficits and behaviors that affect risk of falls.   - [place] fall precautions as indicated by assessment.  - Educate pt/family on patient safety including physical limitations  - Instruct pt to call for assistance with activity based on assessment  - Modify environment to reduce risk of injury  - Provide assistive devices as appropriate  - Consider OT/PT consult to assist with strengthening/mobility  - Encourage toileting schedule  Outcome: Adequate for Discharge     Problem: DISCHARGE PLANNING  Goal: Discharge to home or other facility with appropriate resources  Description: INTERVENTIONS:  - Identify barriers to discharge w/pt and caregiver  - Include patient/family/discharge partner in discharge planning  - Arrange for needed discharge resources and transportation as appropriate  - Identify discharge learning needs (meds, wound care, etc)  - Arrange for interpreters to assist at discharge as needed  - Consider post-discharge preferences of patient/family/discharge partner  - Complete POLST form as appropriate  - Assess patient's ability to be responsible for managing their own health  - Refer to Case Management Department for coordinating discharge planning if the patient needs post-hospital services based on physician/LIP order or complex needs related to functional status, cognitive ability or social support system  Outcome: Adequate for Discharge

## 2022-06-25 NOTE — PHYSICAL THERAPY NOTE
PHYSICAL THERAPY TREATMENT NOTE - INPATIENT     Room Number: 424/424-A       Presenting Problem: s/p left SI joint fusion 6/24/22       Problem List  Principal Problem:    Chronic left SI joint pain  Active Problems:    Other specified hypothyroidism    Essential hypertension      PHYSICAL THERAPY ASSESSMENT     Rn approved PT session and pt medicated, willing to work with therapist. Therapist donned with PPE; mask and gloved. Pt 's  with mask on and pt with mask on when amb outside of room. Instructed on importance to maintain back precautions with all mobility. Performing supine, gentle exs with ble's to promote functional ability. Pt able to recall back precautions and maintain them with mobiity. Supine to sit by log roll with SBA and sitting brad on EOB. Sit to stand with SBA and cues for WBAT on LLE. Pt was amb with weight acceptance on LLE to bathroom. Practice stoop negotiated: up/down with RW 2x with CGA, pt  present during PT session. Cont to amb outside of room, Pt amb with increased WBring on LE and provided cues for amb with upright posture. Pt taking 1 seated rest break and cont to amb with improve gait pattern. Pt requested to position in bed with ice pack on LB. All needs in reach and RN aware of pt progress and location. The patient's Approx Degree of Impairment: 35.83% has been calculated based on documentation in the [facility] '6 clicks' Inpatient Basic Mobility Short Form. Research supports that patients with this level of impairment may benefit from discharged to home. DISCHARGE RECOMMENDATIONS  PT Discharge Recommendations: Home     PLAN  PT Treatment Plan: Bed mobility; Body mechanics; Patient education;Gait training;Stoop training;Transfer training;Balance training    SUBJECTIVE  I just lie down with ice pack on LB.     OBJECTIVE  Precautions: Bed/chair alarm    WEIGHT BEARING RESTRICTION           L Lower Extremity: Weight Bearing as Tolerated (per Dr Josh Hicks PA, rn Mojgan Figueroa spoke with her via phone)    PAIN ASSESSMENT   Ratin  Location: surgical site  Management Techniques: Activity promotion; Body mechanics; Relaxation;Repositioning    BALANCE  Static Sitting: Good  Dynamic Sitting: Good  Static Standing: Fair +  Dynamic Standing: Fair    ACTIVITY TOLERANCE                          O2 WALK       AM-PAC '6-Clicks' INPATIENT SHORT FORM - BASIC MOBILITY  How much difficulty does the patient currently have. .. Patient Difficulty: Turning over in bed (including adjusting bedclothes, sheets and blankets)?: None   Patient Difficulty: Sitting down on and standing up from a chair with arms (e.g., wheelchair, bedside commode, etc.): None   Patient Difficulty: Moving from lying on back to sitting on the side of the bed?: A Little   How much help from another person does the patient currently need. .. Help from Another: Moving to and from a bed to a chair (including a wheelchair)?: A Little   Help from Another: Need to walk in hospital room?: A Little   Help from Another: Climbing 3-5 steps with a railing?: A Little     AM-PAC Score:  Raw Score: 20   Approx Degree of Impairment: 35.83%   Standardized Score (AM-PAC Scale): 47.67   CMS Modifier (G-Code): CJ    FUNCTIONAL ABILITY STATUS  Functional Mobility/Gait Assessment  Gait Assistance:  (SBA)  Distance (ft): 100ft  Assistive Device: Rolling walker  Pattern: L Decreased stance time  Stairs: Stoop/curb  Stoop/Curb: stoop up/down 2x with RW    Additional information:     THERAPEUTIC EXERCISES  Lower Extremity Ankle pumps  Heel raises  Quad sets     Position Supine       Patient End of Session: In bed;Needs met;Call light within reach;RN aware of session/findings; All patient questions and concerns addressed; Ice applied;SCDs in place; Family present    CURRENT GOALS     Goals to be met by: 22  Patient Goal Patient's self-stated goal is: to go home   Goal #1 Patient is able to demonstrate supine - sit EOB @ level: supervision     Goal #1   Current Status SBA by log roll   Goal #2 Patient is able to demonstrate transfers Sit to/from Stand at assistance level: supervision with walker - rolling     Goal #2  Current Status SBA from higher level of seat with RW   Goal #3 Patient is able to ambulate 30 feet with assist device: walker - rolling at assistance level: supervision   Goal #3   Current Status amb 100 ft with RW WBAT on LLE. Goal #4 Patient will negotiate one curb w/ assistive device and supervision   Goal #4   Current Status Curb negotiated 2x with using RW. Goal #5 Patient to demonstrate independence with home activity/exercise instructions provided to patient in preparation for discharge.    Goal #5   Current Status In progress   Goal #6    Goal #6  Current Status

## 2022-06-27 NOTE — DISCHARGE SUMMARY
[facility]    PATIENT'S NAME: Eugenia Epperson   ATTENDING PHYSICIAN: Shira Floyd MD   PATIENT ACCOUNT#:   [id number]    LOCATION:  [address] [place], [place] [address]eliot Hernandez #:   [id number]       YOB: 1965  ADMISSION DATE:       06/24/2022      DISCHARGE DATE:  06/25/2022    DISCHARGE SUMMARY    DISCHARGE DIAGNOSES:    1. Chronic left sacroiliac joint pain, present on admission. 2.   Hypothyroidism, present on admission. 3.   Hypertension, present on admission. 4.   Chronic pain, present on admission. HISTORY AND HOSPITAL COURSE:  The patient is a 79-year-old female with past medical history of chronic pain and chronic narcotic use, was admitted to the hospital.  She has had a history of chronic left SI joint pain. She is status post left sacroiliac joint fusion. She was admitted overnight for pain control as she does require heavy doses of narcotics. She was seen and examined this morning. She states her pain is controlled at this time and she has been deemed stable to be discharged home once evaluated by orthopedic surgery. All the above information was given to the patient. She verbalized understanding of information given. For details regarding the patient's hospitalization, please refer to patient's chart. PHYSICAL EXAMINATION:    GENERAL:  Currently patient lying in bed. Patient  appears to be in no acute distress at this time. She is A and O x3. VITAL SIGNS:   Reviewed from the patient's chart and currently stable. HEENT:  Extraocular movements are intact. Pupils equally round and reactive to light and accommodation. Atraumatic, normocephalic. LUNGS:  Good air entry bilaterally. CARDIAC:  S1, S2 appreciated. ABDOMEN:  Soft, nontender, nondistended. Positive bowel sounds. EXTREMITIES:  Peripheral pulses are positive. NEUROLOGICAL:  No focal neurological deficits noted at this time. LABORATORY DATA:  Labs are reviewed.      CONDITION ON DISCHARGE:  At this time, the patient is deemed stable to be discharged home to follow up with primary care physician as outpatient. Total discharge time was greater than 30 minutes.     Dictated By Marlene Lara MD  d: 06/25/2022 13:47:54  t: 06/26/2022 22:54:40  [facility] [id number]  [facility]/

## 2023-01-23 ENCOUNTER — TELEPHONE (OUTPATIENT)
Dept: FAMILY MEDICINE CLINIC | Facility: CLINIC | Age: 58
End: 2023-01-23

## 2023-01-23 RX ORDER — FAMOTIDINE 10 MG
20 TABLET ORAL 2 TIMES DAILY
COMMUNITY

## 2023-01-25 ENCOUNTER — EKG ENCOUNTER (OUTPATIENT)
Dept: LAB | Facility: HOSPITAL | Age: 58
End: 2023-01-25
Attending: FAMILY MEDICINE
Payer: COMMERCIAL

## 2023-01-25 ENCOUNTER — OFFICE VISIT (OUTPATIENT)
Dept: FAMILY MEDICINE CLINIC | Facility: CLINIC | Age: 58
End: 2023-01-25
Payer: COMMERCIAL

## 2023-01-25 VITALS
BODY MASS INDEX: 29.03 KG/M2 | RESPIRATION RATE: 16 BRPM | WEIGHT: 185 LBS | TEMPERATURE: 98 F | SYSTOLIC BLOOD PRESSURE: 94 MMHG | HEART RATE: 68 BPM | OXYGEN SATURATION: 98 % | HEIGHT: 67 IN | DIASTOLIC BLOOD PRESSURE: 62 MMHG

## 2023-01-25 DIAGNOSIS — Z01.818 PREOPERATIVE EXAMINATION, UNSPECIFIED: Primary | ICD-10-CM

## 2023-01-25 DIAGNOSIS — Z01.818 PREOPERATIVE EXAMINATION, UNSPECIFIED: ICD-10-CM

## 2023-01-25 DIAGNOSIS — M16.12 ARTHRITIS OF LEFT HIP: ICD-10-CM

## 2023-01-25 DIAGNOSIS — Z98.84 S/P GASTRIC BYPASS: ICD-10-CM

## 2023-01-25 DIAGNOSIS — E03.8 OTHER SPECIFIED HYPOTHYROIDISM: ICD-10-CM

## 2023-01-25 DIAGNOSIS — I77.810 AORTIC ROOT DILATATION (HCC): ICD-10-CM

## 2023-01-25 DIAGNOSIS — I25.10 CORONARY ARTERY DISEASE INVOLVING NATIVE CORONARY ARTERY OF NATIVE HEART WITHOUT ANGINA PECTORIS: ICD-10-CM

## 2023-01-25 DIAGNOSIS — Z01.812 PRE-OPERATIVE LABORATORY EXAMINATION: ICD-10-CM

## 2023-01-25 DIAGNOSIS — K44.9 HIATAL HERNIA WITH GASTROESOPHAGEAL REFLUX DISEASE AND ESOPHAGITIS: ICD-10-CM

## 2023-01-25 DIAGNOSIS — K21.00 HIATAL HERNIA WITH GASTROESOPHAGEAL REFLUX DISEASE AND ESOPHAGITIS: ICD-10-CM

## 2023-01-25 DIAGNOSIS — Z87.891 HISTORY OF TOBACCO ABUSE: ICD-10-CM

## 2023-01-25 DIAGNOSIS — I10 ESSENTIAL HYPERTENSION: ICD-10-CM

## 2023-01-25 DIAGNOSIS — G89.4 CHRONIC PAIN SYNDROME: ICD-10-CM

## 2023-01-25 LAB
ALBUMIN SERPL-MCNC: 4.2 G/DL (ref 3.4–5)
ALBUMIN/GLOB SERPL: 1.2 {RATIO} (ref 1–2)
ALP LIVER SERPL-CCNC: 47 U/L
ALT SERPL-CCNC: 34 U/L
ANION GAP SERPL CALC-SCNC: 8 MMOL/L (ref 0–18)
ANTIBODY SCREEN: NEGATIVE
AST SERPL-CCNC: 22 U/L (ref 15–37)
BASOPHILS # BLD AUTO: 0.02 X10(3) UL (ref 0–0.2)
BASOPHILS NFR BLD AUTO: 0.3 %
BILIRUB SERPL-MCNC: 0.5 MG/DL (ref 0.1–2)
BUN BLD-MCNC: 16 MG/DL (ref 7–18)
BUN/CREAT SERPL: 20 (ref 10–20)
CALCIUM BLD-MCNC: 9.3 MG/DL (ref 8.5–10.1)
CHLORIDE SERPL-SCNC: 103 MMOL/L (ref 98–112)
CO2 SERPL-SCNC: 27 MMOL/L (ref 21–32)
CREAT BLD-MCNC: 0.8 MG/DL
DEPRECATED RDW RBC AUTO: 42.4 FL (ref 35.1–46.3)
EOSINOPHIL # BLD AUTO: 0.08 X10(3) UL (ref 0–0.7)
EOSINOPHIL NFR BLD AUTO: 1.2 %
ERYTHROCYTE [DISTWIDTH] IN BLOOD BY AUTOMATED COUNT: 11.9 % (ref 11–15)
FASTING STATUS PATIENT QL REPORTED: YES
GFR SERPLBLD BASED ON 1.73 SQ M-ARVRAT: 85 ML/MIN/1.73M2 (ref 60–?)
GLOBULIN PLAS-MCNC: 3.4 G/DL (ref 2.8–4.4)
GLUCOSE BLD-MCNC: 82 MG/DL (ref 70–99)
HCT VFR BLD AUTO: 43.2 %
HGB BLD-MCNC: 13.9 G/DL
IMM GRANULOCYTES # BLD AUTO: 0.03 X10(3) UL (ref 0–1)
IMM GRANULOCYTES NFR BLD: 0.5 %
LYMPHOCYTES # BLD AUTO: 1.95 X10(3) UL (ref 1–4)
LYMPHOCYTES NFR BLD AUTO: 30 %
MCH RBC QN AUTO: 31.1 PG (ref 26–34)
MCHC RBC AUTO-ENTMCNC: 32.2 G/DL (ref 31–37)
MCV RBC AUTO: 96.6 FL
MONOCYTES # BLD AUTO: 0.46 X10(3) UL (ref 0.1–1)
MONOCYTES NFR BLD AUTO: 7.1 %
NEUTROPHILS # BLD AUTO: 3.97 X10 (3) UL (ref 1.5–7.7)
NEUTROPHILS # BLD AUTO: 3.97 X10(3) UL (ref 1.5–7.7)
NEUTROPHILS NFR BLD AUTO: 60.9 %
OSMOLALITY SERPL CALC.SUM OF ELEC: 286 MOSM/KG (ref 275–295)
PLATELET # BLD AUTO: 244 10(3)UL (ref 150–450)
POTASSIUM SERPL-SCNC: 3.9 MMOL/L (ref 3.5–5.1)
PROT SERPL-MCNC: 7.6 G/DL (ref 6.4–8.2)
RBC # BLD AUTO: 4.47 X10(6)UL
RH BLOOD TYPE: POSITIVE
SODIUM SERPL-SCNC: 138 MMOL/L (ref 136–145)
WBC # BLD AUTO: 6.5 X10(3) UL (ref 4–11)

## 2023-01-25 PROCEDURE — 86900 BLOOD TYPING SEROLOGIC ABO: CPT | Performed by: FAMILY MEDICINE

## 2023-01-25 PROCEDURE — 80053 COMPREHEN METABOLIC PANEL: CPT

## 2023-01-25 PROCEDURE — 93010 ELECTROCARDIOGRAM REPORT: CPT | Performed by: INTERNAL MEDICINE

## 2023-01-25 PROCEDURE — 86901 BLOOD TYPING SEROLOGIC RH(D): CPT | Performed by: FAMILY MEDICINE

## 2023-01-25 PROCEDURE — 36415 COLL VENOUS BLD VENIPUNCTURE: CPT | Performed by: FAMILY MEDICINE

## 2023-01-25 PROCEDURE — 87081 CULTURE SCREEN ONLY: CPT

## 2023-01-25 PROCEDURE — 93005 ELECTROCARDIOGRAM TRACING: CPT

## 2023-01-25 PROCEDURE — 86850 RBC ANTIBODY SCREEN: CPT | Performed by: FAMILY MEDICINE

## 2023-01-25 PROCEDURE — 85025 COMPLETE CBC W/AUTO DIFF WBC: CPT

## 2023-01-25 NOTE — TELEPHONE ENCOUNTER
Dr. Brice Salmeron, please review:    Labs- MRSA neg, all other labs WNL  EKG- sinus rhythm with occasional PCV, low voltage QRS, consider pulmonary disease, pericardial effusion, or normal variant, cannot rule out anterior infarct. Previous EKG done 06/11/22 in Cardio tab. X-ray- done 05/11/22 WNL    Cardiologist- Dr. Gonzalo Puckett  [phone]  [phone]  Cardiac Clx in Encounters 05/17/22- [facility]  \"EKG is stable with no acute changes. Cardiac testing: There is no evidence of unstable coronary syndrome, decompensated heart failure, critical valvular heart disease, or electrical instability. Risk of Cardiovascular complications in non-prohibitive. Patient understands that no immunity from risk exists\"    Pt getting Abd US at [facility] done on 01/30, checking on naval drainage. OK for surgery?

## 2023-01-26 LAB
ATRIAL RATE: 71 BPM
P-R INTERVAL: 196 MS
Q-T INTERVAL: 396 MS
QRS DURATION: 58 MS
QTC CALCULATION (BEZET): 430 MS
R AXIS: 32 DEGREES
T AXIS: 48 DEGREES
VENTRICULAR RATE: 71 BPM

## 2023-01-27 NOTE — DISCHARGE SUMMARY
Ortho Discharge Summary     Patient ID:  Jd Figueroa  [id number]  61year old  11/8/1965      Admit Date: (2/6/2023  7:23 AM     Discharge Date and Time: 2/7/2023 12:03 PM     Attending Physician: Perez Cintron MD     Reason for admission: L hip  DJD    Discharge Diagnoses: Left hip degenerative disease    Discharged Condition: stable    Hospital Course:  Routine  Medical course per internist     Consults: IP CONSULT TO CASE MANAGEMENT  IP CONSULT TO FAMILY/INTERNAL MED  IP CONSULT TO SOCIAL WORK  IP CONSULT TO SOCIAL WORK  IP CONSULT TO PAIN MANAGEMENT    Procedures: LTHA    Disposition: home    Patient Instructions: Activity: activity as tolerated and no driving for 2 weeks    Diet: regular diet     Wound Care: Aquacel Ag x 7 days then leave open to air if no drainage present. Glued incisions may get wet in the shower if no drainage present. Stapled/ draining incisions must be covered in Saran wrap when showering. Weight bearing status: weight bearing as tolerated    Follow-up with Dr. Kendra Harrison in 2 weeks.     DVT ppx: Aspirin

## 2023-01-31 NOTE — TELEPHONE ENCOUNTER
Spoke to patient, went over test results and let her know she is clear for surgery. She had the US of abdomen and MRI done 01/30/23 at [facility] but they have not resulted. Will send to THE [facility] when available to make sure all is ok still for surgery.

## 2023-02-01 NOTE — TELEPHONE ENCOUNTER
Spoke to patient, Dr. Vince Mcwilliams reviewed the abdominal US- in [facility] done 01/30/23. He said it basically is showing a cyst or collection of fluid where the fluid is leaking out of her belly button occasionally. He is OK with her proceeding to surgery with this finding. He suggested her following up with her PCP to get a repeat US in 6mo to a year to see if the size changes. If she wants to get a General Surgeon consult at some point she can do that too. Patient given Dr. Calle Paci office info. She was also told to watch the drainage coming from the belly button, if there is an odor or the fluid changes to yellow/green or pus she needs to get an antibiotic right away. Patient states understanding.

## 2023-02-03 ENCOUNTER — LAB ENCOUNTER (OUTPATIENT)
Dept: LAB | Age: 58
End: 2023-02-03
Attending: ORTHOPAEDIC SURGERY
Payer: COMMERCIAL

## 2023-02-03 DIAGNOSIS — Z01.818 PREOPERATIVE TESTING: ICD-10-CM

## 2023-02-04 LAB — SARS-COV-2 RNA RESP QL NAA+PROBE: NOT DETECTED

## 2023-02-06 ENCOUNTER — ANESTHESIA EVENT (OUTPATIENT)
Dept: SURGERY | Facility: HOSPITAL | Age: 58
End: 2023-02-06
Payer: COMMERCIAL

## 2023-02-06 ENCOUNTER — ANESTHESIA (OUTPATIENT)
Dept: SURGERY | Facility: HOSPITAL | Age: 58
End: 2023-02-06
Payer: COMMERCIAL

## 2023-02-06 ENCOUNTER — APPOINTMENT (OUTPATIENT)
Dept: GENERAL RADIOLOGY | Facility: HOSPITAL | Age: 58
End: 2023-02-06
Attending: PHYSICIAN ASSISTANT
Payer: COMMERCIAL

## 2023-02-06 ENCOUNTER — HOSPITAL ENCOUNTER (OUTPATIENT)
Facility: HOSPITAL | Age: 58
Discharge: HOME HEALTH | End: 2023-02-07
Attending: ORTHOPAEDIC SURGERY | Admitting: ORTHOPAEDIC SURGERY
Payer: COMMERCIAL

## 2023-02-06 DIAGNOSIS — Z01.818 PREOPERATIVE TESTING: Primary | ICD-10-CM

## 2023-02-06 PROBLEM — Z96.659 HISTORY OF REVISION OF TOTAL KNEE ARTHROPLASTY: Status: ACTIVE | Noted: 2023-02-06

## 2023-02-06 PROBLEM — Z96.642 STATUS POST LEFT HIP REPLACEMENT: Status: ACTIVE | Noted: 2023-02-06

## 2023-02-06 LAB
ALBUMIN SERPL-MCNC: 3.8 G/DL (ref 3.4–5)
ALBUMIN/GLOB SERPL: 1.3 {RATIO} (ref 1–2)
ALP LIVER SERPL-CCNC: 46 U/L
ALT SERPL-CCNC: 26 U/L
ANION GAP SERPL CALC-SCNC: 7 MMOL/L (ref 0–18)
AST SERPL-CCNC: 23 U/L (ref 15–37)
BILIRUB SERPL-MCNC: 0.2 MG/DL (ref 0.1–2)
BUN BLD-MCNC: 14 MG/DL (ref 7–18)
BUN/CREAT SERPL: 20 (ref 10–20)
CALCIUM BLD-MCNC: 8.9 MG/DL (ref 8.5–10.1)
CHLORIDE SERPL-SCNC: 107 MMOL/L (ref 98–112)
CO2 SERPL-SCNC: 24 MMOL/L (ref 21–32)
CREAT BLD-MCNC: 0.7 MG/DL
GFR SERPLBLD BASED ON 1.73 SQ M-ARVRAT: 100 ML/MIN/1.73M2 (ref 60–?)
GLOBULIN PLAS-MCNC: 3 G/DL (ref 2.8–4.4)
GLUCOSE BLD-MCNC: 95 MG/DL (ref 70–99)
HCT VFR BLD AUTO: 40.5 %
HGB BLD-MCNC: 13.1 G/DL
OSMOLALITY SERPL CALC.SUM OF ELEC: 286 MOSM/KG (ref 275–295)
POTASSIUM SERPL-SCNC: 4 MMOL/L (ref 3.5–5.1)
PROT SERPL-MCNC: 6.8 G/DL (ref 6.4–8.2)
SODIUM SERPL-SCNC: 138 MMOL/L (ref 136–145)

## 2023-02-06 PROCEDURE — 85018 HEMOGLOBIN: CPT | Performed by: PHYSICIAN ASSISTANT

## 2023-02-06 PROCEDURE — 85014 HEMATOCRIT: CPT | Performed by: PHYSICIAN ASSISTANT

## 2023-02-06 PROCEDURE — 88311 DECALCIFY TISSUE: CPT | Performed by: ORTHOPAEDIC SURGERY

## 2023-02-06 PROCEDURE — 97161 PT EVAL LOW COMPLEX 20 MIN: CPT

## 2023-02-06 PROCEDURE — 73502 X-RAY EXAM HIP UNI 2-3 VIEWS: CPT | Performed by: PHYSICIAN ASSISTANT

## 2023-02-06 PROCEDURE — 80053 COMPREHEN METABOLIC PANEL: CPT | Performed by: PHYSICIAN ASSISTANT

## 2023-02-06 PROCEDURE — 97530 THERAPEUTIC ACTIVITIES: CPT

## 2023-02-06 PROCEDURE — 88305 TISSUE EXAM BY PATHOLOGIST: CPT | Performed by: ORTHOPAEDIC SURGERY

## 2023-02-06 PROCEDURE — 0SRB0JA REPLACEMENT OF LEFT HIP JOINT WITH SYNTHETIC SUBSTITUTE, UNCEMENTED, OPEN APPROACH: ICD-10-PCS | Performed by: ORTHOPAEDIC SURGERY

## 2023-02-06 DEVICE — IMPLANTABLE DEVICE: Type: IMPLANTABLE DEVICE | Site: HIP | Status: FUNCTIONAL

## 2023-02-06 RX ORDER — ONDANSETRON 2 MG/ML
4 INJECTION INTRAMUSCULAR; INTRAVENOUS EVERY 6 HOURS PRN
Status: DISCONTINUED | OUTPATIENT
Start: 2023-02-06 | End: 2023-02-07

## 2023-02-06 RX ORDER — BISACODYL 10 MG
10 SUPPOSITORY, RECTAL RECTAL
Status: DISCONTINUED | OUTPATIENT
Start: 2023-02-06 | End: 2023-02-07

## 2023-02-06 RX ORDER — DOCUSATE SODIUM 100 MG/1
100 CAPSULE, LIQUID FILLED ORAL 2 TIMES DAILY
Status: DISCONTINUED | OUTPATIENT
Start: 2023-02-06 | End: 2023-02-07

## 2023-02-06 RX ORDER — MONTELUKAST SODIUM 10 MG/1
10 TABLET ORAL NIGHTLY
Status: DISCONTINUED | OUTPATIENT
Start: 2023-02-06 | End: 2023-02-07

## 2023-02-06 RX ORDER — POLYETHYLENE GLYCOL 3350 17 G/17G
17 POWDER, FOR SOLUTION ORAL DAILY PRN
Status: DISCONTINUED | OUTPATIENT
Start: 2023-02-06 | End: 2023-02-07

## 2023-02-06 RX ORDER — PROCHLORPERAZINE EDISYLATE 5 MG/ML
5 INJECTION INTRAMUSCULAR; INTRAVENOUS EVERY 8 HOURS PRN
Status: DISCONTINUED | OUTPATIENT
Start: 2023-02-06 | End: 2023-02-06 | Stop reason: HOSPADM

## 2023-02-06 RX ORDER — HYDROMORPHONE HYDROCHLORIDE 1 MG/ML
0.4 INJECTION, SOLUTION INTRAMUSCULAR; INTRAVENOUS; SUBCUTANEOUS EVERY 2 HOUR PRN
Status: DISCONTINUED | OUTPATIENT
Start: 2023-02-06 | End: 2023-02-07

## 2023-02-06 RX ORDER — FAMOTIDINE 10 MG/ML
20 INJECTION, SOLUTION INTRAVENOUS 2 TIMES DAILY
Status: DISCONTINUED | OUTPATIENT
Start: 2023-02-06 | End: 2023-02-07

## 2023-02-06 RX ORDER — SENNOSIDES 8.6 MG
17.2 TABLET ORAL NIGHTLY
Status: DISCONTINUED | OUTPATIENT
Start: 2023-02-06 | End: 2023-02-07

## 2023-02-06 RX ORDER — ASPIRIN 81 MG/1
81 TABLET ORAL 2 TIMES DAILY
Qty: 60 TABLET | Refills: 0 | Status: SHIPPED | OUTPATIENT
Start: 2023-02-06 | End: 2023-03-08

## 2023-02-06 RX ORDER — CETIRIZINE HYDROCHLORIDE 10 MG/1
10 TABLET ORAL NIGHTLY
Status: DISCONTINUED | OUTPATIENT
Start: 2023-02-06 | End: 2023-02-07

## 2023-02-06 RX ORDER — HYDROMORPHONE HYDROCHLORIDE 1 MG/ML
0.6 INJECTION, SOLUTION INTRAMUSCULAR; INTRAVENOUS; SUBCUTANEOUS EVERY 2 HOUR PRN
Status: DISCONTINUED | OUTPATIENT
Start: 2023-02-06 | End: 2023-02-07

## 2023-02-06 RX ORDER — ONDANSETRON 2 MG/ML
4 INJECTION INTRAMUSCULAR; INTRAVENOUS EVERY 6 HOURS PRN
Status: DISCONTINUED | OUTPATIENT
Start: 2023-02-06 | End: 2023-02-06 | Stop reason: HOSPADM

## 2023-02-06 RX ORDER — BUPIVACAINE HYDROCHLORIDE 7.5 MG/ML
INJECTION, SOLUTION INTRASPINAL
Status: COMPLETED | OUTPATIENT
Start: 2023-02-06 | End: 2023-02-06

## 2023-02-06 RX ORDER — MORPHINE SULFATE 10 MG/ML
6 INJECTION, SOLUTION INTRAMUSCULAR; INTRAVENOUS EVERY 10 MIN PRN
Status: DISCONTINUED | OUTPATIENT
Start: 2023-02-06 | End: 2023-02-06 | Stop reason: HOSPADM

## 2023-02-06 RX ORDER — HYDROMORPHONE HYDROCHLORIDE 1 MG/ML
0.2 INJECTION, SOLUTION INTRAMUSCULAR; INTRAVENOUS; SUBCUTANEOUS EVERY 2 HOUR PRN
Status: DISCONTINUED | OUTPATIENT
Start: 2023-02-06 | End: 2023-02-06

## 2023-02-06 RX ORDER — CEFAZOLIN SODIUM/WATER 2 G/20 ML
2 SYRINGE (ML) INTRAVENOUS EVERY 8 HOURS
Status: COMPLETED | OUTPATIENT
Start: 2023-02-06 | End: 2023-02-07

## 2023-02-06 RX ORDER — CEFADROXIL 500 MG/1
500 CAPSULE ORAL 2 TIMES DAILY
Qty: 14 CAPSULE | Refills: 0 | Status: SHIPPED | OUTPATIENT
Start: 2023-02-06 | End: 2023-02-13

## 2023-02-06 RX ORDER — DIPHENHYDRAMINE HCL 25 MG
25 CAPSULE ORAL EVERY 4 HOURS PRN
Status: DISCONTINUED | OUTPATIENT
Start: 2023-02-06 | End: 2023-02-07

## 2023-02-06 RX ORDER — ASPIRIN 81 MG/1
81 TABLET ORAL 2 TIMES DAILY
Status: DISCONTINUED | OUTPATIENT
Start: 2023-02-06 | End: 2023-02-06

## 2023-02-06 RX ORDER — HYDROCODONE BITARTRATE AND ACETAMINOPHEN 10; 325 MG/1; MG/1
1 TABLET ORAL EVERY 4 HOURS PRN
Status: DISCONTINUED | OUTPATIENT
Start: 2023-02-06 | End: 2023-02-06

## 2023-02-06 RX ORDER — ACETAMINOPHEN 500 MG
1000 TABLET ORAL ONCE
Status: DISCONTINUED | OUTPATIENT
Start: 2023-02-06 | End: 2023-02-06 | Stop reason: HOSPADM

## 2023-02-06 RX ORDER — SODIUM CHLORIDE, SODIUM LACTATE, POTASSIUM CHLORIDE, CALCIUM CHLORIDE 600; 310; 30; 20 MG/100ML; MG/100ML; MG/100ML; MG/100ML
INJECTION, SOLUTION INTRAVENOUS CONTINUOUS
Status: DISCONTINUED | OUTPATIENT
Start: 2023-02-06 | End: 2023-02-06 | Stop reason: HOSPADM

## 2023-02-06 RX ORDER — OXYCODONE HYDROCHLORIDE 5 MG/1
10 TABLET ORAL ONCE
Status: DISCONTINUED | OUTPATIENT
Start: 2023-02-06 | End: 2023-02-06 | Stop reason: HOSPADM

## 2023-02-06 RX ORDER — HYDROMORPHONE HYDROCHLORIDE 1 MG/ML
0.4 INJECTION, SOLUTION INTRAMUSCULAR; INTRAVENOUS; SUBCUTANEOUS EVERY 5 MIN PRN
Status: DISCONTINUED | OUTPATIENT
Start: 2023-02-06 | End: 2023-02-06 | Stop reason: HOSPADM

## 2023-02-06 RX ORDER — PANTOPRAZOLE SODIUM 40 MG/1
40 TABLET, DELAYED RELEASE ORAL
Qty: 30 TABLET | Refills: 0 | Status: SHIPPED | OUTPATIENT
Start: 2023-02-06

## 2023-02-06 RX ORDER — TRANEXAMIC ACID 10 MG/ML
1000 INJECTION, SOLUTION INTRAVENOUS ONCE
Status: COMPLETED | OUTPATIENT
Start: 2023-02-06 | End: 2023-02-07

## 2023-02-06 RX ORDER — CEFAZOLIN SODIUM/WATER 2 G/20 ML
2 SYRINGE (ML) INTRAVENOUS ONCE
Status: COMPLETED | OUTPATIENT
Start: 2023-02-06 | End: 2023-02-06

## 2023-02-06 RX ORDER — ONDANSETRON 4 MG/1
4 TABLET, FILM COATED ORAL EVERY 8 HOURS PRN
Qty: 30 TABLET | Refills: 0 | Status: SHIPPED | OUTPATIENT
Start: 2023-02-06

## 2023-02-06 RX ORDER — DIPHENHYDRAMINE HYDROCHLORIDE 50 MG/ML
12.5 INJECTION INTRAMUSCULAR; INTRAVENOUS EVERY 4 HOURS PRN
Status: DISCONTINUED | OUTPATIENT
Start: 2023-02-06 | End: 2023-02-07

## 2023-02-06 RX ORDER — HYDROMORPHONE HYDROCHLORIDE 1 MG/ML
0.6 INJECTION, SOLUTION INTRAMUSCULAR; INTRAVENOUS; SUBCUTANEOUS EVERY 5 MIN PRN
Status: DISCONTINUED | OUTPATIENT
Start: 2023-02-06 | End: 2023-02-06 | Stop reason: HOSPADM

## 2023-02-06 RX ORDER — MIDAZOLAM HYDROCHLORIDE 1 MG/ML
INJECTION INTRAMUSCULAR; INTRAVENOUS AS NEEDED
Status: DISCONTINUED | OUTPATIENT
Start: 2023-02-06 | End: 2023-02-06 | Stop reason: SURG

## 2023-02-06 RX ORDER — SODIUM PHOSPHATE, DIBASIC AND SODIUM PHOSPHATE, MONOBASIC 7; 19 G/133ML; G/133ML
1 ENEMA RECTAL ONCE AS NEEDED
Status: DISCONTINUED | OUTPATIENT
Start: 2023-02-06 | End: 2023-02-07

## 2023-02-06 RX ORDER — MAGNESIUM HYDROXIDE 1200 MG/15ML
LIQUID ORAL CONTINUOUS PRN
Status: COMPLETED | OUTPATIENT
Start: 2023-02-06 | End: 2023-02-06

## 2023-02-06 RX ORDER — MORPHINE SULFATE 4 MG/ML
2 INJECTION, SOLUTION INTRAMUSCULAR; INTRAVENOUS EVERY 10 MIN PRN
Status: DISCONTINUED | OUTPATIENT
Start: 2023-02-06 | End: 2023-02-06 | Stop reason: HOSPADM

## 2023-02-06 RX ORDER — LIOTHYRONINE SODIUM 5 UG/1
5 TABLET ORAL DAILY
Status: DISCONTINUED | OUTPATIENT
Start: 2023-02-07 | End: 2023-02-07

## 2023-02-06 RX ORDER — SODIUM CHLORIDE, SODIUM LACTATE, POTASSIUM CHLORIDE, CALCIUM CHLORIDE 600; 310; 30; 20 MG/100ML; MG/100ML; MG/100ML; MG/100ML
INJECTION, SOLUTION INTRAVENOUS CONTINUOUS
Status: DISCONTINUED | OUTPATIENT
Start: 2023-02-06 | End: 2023-02-07

## 2023-02-06 RX ORDER — METHOCARBAMOL 750 MG/1
750 TABLET, FILM COATED ORAL 2 TIMES DAILY
Status: DISCONTINUED | OUTPATIENT
Start: 2023-02-06 | End: 2023-02-07

## 2023-02-06 RX ORDER — TRANEXAMIC ACID 10 MG/ML
INJECTION, SOLUTION INTRAVENOUS AS NEEDED
Status: DISCONTINUED | OUTPATIENT
Start: 2023-02-06 | End: 2023-02-06 | Stop reason: SURG

## 2023-02-06 RX ORDER — FAMOTIDINE 20 MG/1
20 TABLET, FILM COATED ORAL 2 TIMES DAILY
Status: DISCONTINUED | OUTPATIENT
Start: 2023-02-06 | End: 2023-02-07

## 2023-02-06 RX ORDER — NALOXONE HYDROCHLORIDE 0.4 MG/ML
80 INJECTION, SOLUTION INTRAMUSCULAR; INTRAVENOUS; SUBCUTANEOUS AS NEEDED
Status: DISCONTINUED | OUTPATIENT
Start: 2023-02-06 | End: 2023-02-06 | Stop reason: HOSPADM

## 2023-02-06 RX ORDER — PROCHLORPERAZINE EDISYLATE 5 MG/ML
5 INJECTION INTRAMUSCULAR; INTRAVENOUS EVERY 8 HOURS PRN
Status: DISCONTINUED | OUTPATIENT
Start: 2023-02-06 | End: 2023-02-07

## 2023-02-06 RX ORDER — POLYETHYLENE GLYCOL 3350 17 G/17G
17 POWDER, FOR SOLUTION ORAL 2 TIMES DAILY
Status: DISCONTINUED | OUTPATIENT
Start: 2023-02-06 | End: 2023-02-07

## 2023-02-06 RX ORDER — MEPERIDINE HYDROCHLORIDE 25 MG/ML
12.5 INJECTION INTRAMUSCULAR; INTRAVENOUS; SUBCUTANEOUS AS NEEDED
Status: DISCONTINUED | OUTPATIENT
Start: 2023-02-06 | End: 2023-02-06 | Stop reason: HOSPADM

## 2023-02-06 RX ORDER — ACETAMINOPHEN 325 MG/1
650 TABLET ORAL EVERY 6 HOURS PRN
Status: DISCONTINUED | OUTPATIENT
Start: 2023-02-06 | End: 2023-02-07

## 2023-02-06 RX ORDER — HYDROMORPHONE HYDROCHLORIDE 1 MG/ML
0.4 INJECTION, SOLUTION INTRAMUSCULAR; INTRAVENOUS; SUBCUTANEOUS EVERY 2 HOUR PRN
Status: DISCONTINUED | OUTPATIENT
Start: 2023-02-06 | End: 2023-02-06

## 2023-02-06 RX ORDER — DOCUSATE SODIUM 100 MG/1
100 CAPSULE, LIQUID FILLED ORAL 2 TIMES DAILY
Qty: 60 CAPSULE | Refills: 2 | Status: SHIPPED | OUTPATIENT
Start: 2023-02-06

## 2023-02-06 RX ORDER — HYDROCODONE BITARTRATE AND ACETAMINOPHEN 10; 325 MG/1; MG/1
2 TABLET ORAL EVERY 4 HOURS PRN
Status: DISCONTINUED | OUTPATIENT
Start: 2023-02-06 | End: 2023-02-07

## 2023-02-06 RX ORDER — FAMOTIDINE 20 MG/1
20 TABLET, FILM COATED ORAL 2 TIMES DAILY
Status: DISCONTINUED | OUTPATIENT
Start: 2023-02-06 | End: 2023-02-06

## 2023-02-06 RX ORDER — MORPHINE SULFATE 4 MG/ML
4 INJECTION, SOLUTION INTRAMUSCULAR; INTRAVENOUS EVERY 10 MIN PRN
Status: DISCONTINUED | OUTPATIENT
Start: 2023-02-06 | End: 2023-02-06 | Stop reason: HOSPADM

## 2023-02-06 RX ORDER — DOCUSATE SODIUM 100 MG/1
100 CAPSULE, LIQUID FILLED ORAL DAILY PRN
Status: DISCONTINUED | OUTPATIENT
Start: 2023-02-06 | End: 2023-02-07

## 2023-02-06 RX ORDER — HYDROMORPHONE HYDROCHLORIDE 1 MG/ML
0.1 INJECTION, SOLUTION INTRAMUSCULAR; INTRAVENOUS; SUBCUTANEOUS EVERY 2 HOUR PRN
Status: DISCONTINUED | OUTPATIENT
Start: 2023-02-06 | End: 2023-02-06

## 2023-02-06 RX ORDER — LEVOTHYROXINE SODIUM 0.07 MG/1
75 TABLET ORAL
Status: DISCONTINUED | OUTPATIENT
Start: 2023-02-07 | End: 2023-02-07

## 2023-02-06 RX ORDER — HYDROMORPHONE HYDROCHLORIDE 1 MG/ML
0.2 INJECTION, SOLUTION INTRAMUSCULAR; INTRAVENOUS; SUBCUTANEOUS EVERY 5 MIN PRN
Status: DISCONTINUED | OUTPATIENT
Start: 2023-02-06 | End: 2023-02-06 | Stop reason: HOSPADM

## 2023-02-06 RX ORDER — SPIRONOLACTONE 25 MG/1
100 TABLET ORAL DAILY
Status: DISCONTINUED | OUTPATIENT
Start: 2023-02-07 | End: 2023-02-07

## 2023-02-06 RX ORDER — HYDROMORPHONE HYDROCHLORIDE 1 MG/ML
0.2 INJECTION, SOLUTION INTRAMUSCULAR; INTRAVENOUS; SUBCUTANEOUS EVERY 2 HOUR PRN
Status: DISCONTINUED | OUTPATIENT
Start: 2023-02-06 | End: 2023-02-07

## 2023-02-06 RX ORDER — DIPHENHYDRAMINE HYDROCHLORIDE 50 MG/ML
25 INJECTION INTRAMUSCULAR; INTRAVENOUS ONCE AS NEEDED
Status: ACTIVE | OUTPATIENT
Start: 2023-02-06 | End: 2023-02-06

## 2023-02-06 RX ADMIN — MIDAZOLAM HYDROCHLORIDE 2 MG: 1 INJECTION INTRAMUSCULAR; INTRAVENOUS at 09:02:00

## 2023-02-06 RX ADMIN — BUPIVACAINE HYDROCHLORIDE 1.8 ML: 7.5 INJECTION, SOLUTION INTRASPINAL at 09:05:00

## 2023-02-06 RX ADMIN — SODIUM CHLORIDE, SODIUM LACTATE, POTASSIUM CHLORIDE, CALCIUM CHLORIDE: 600; 310; 30; 20 INJECTION, SOLUTION INTRAVENOUS at 10:56:00

## 2023-02-06 RX ADMIN — CEFAZOLIN SODIUM/WATER 2 G: 2 G/20 ML SYRINGE (ML) INTRAVENOUS at 09:15:00

## 2023-02-06 RX ADMIN — TRANEXAMIC ACID 1000 MG: 10 INJECTION, SOLUTION INTRAVENOUS at 09:43:00

## 2023-02-06 NOTE — ADDENDUM NOTE
Addendum  created 02/06/23 1558 by Erin Lynch MD    Order list changed, Pharmacy for encounter modified

## 2023-02-06 NOTE — ANESTHESIA PROCEDURE NOTES
Spinal Block    Date/Time: 2/6/2023 9:05 AM  Performed by: Mervat Torres MD  Authorized by: Mervat Torres MD       General Information and Staff    Start Time:  2/6/2023 9:05 AM  End Time:  2/6/2023 9:10 AM  Anesthesiologist:  Mervat Torres MD  Performed by:   Anesthesiologist  Preanesthetic Checklist: patient identified, IV checked, risks and benefits discussed, monitors and equipment checked, pre-op evaluation, timeout performed, anesthesia consent and sterile technique used      Procedure Details    Patient Position:  Sitting  Prep: ChloraPrep    Monitoring:  Cardiac monitor  Approach:  Midline  Location:  L3-4  Injection Technique:  Single-shot    Needle    Needle Type:  Pencil-tip  Needle Gauge:  24 G  Needle Length:  3.5 in    Assessment    Sensory Level:   Events: clear CSF, CSF aspirated, well tolerated and blood negative      Additional Comments

## 2023-02-06 NOTE — OPERATIVE REPORT
[address] [facility] [place], [facility] [address], [address]  OPERATIVE REPORT  PATIENT NAME: Hemal Zimmerman? MR#: [id number]? ATTENDING MD: Evelyn Billings? DATE OF PROCEDURE: 2/6/2023? PREOPERATIVE DIAGNOSIS: Degenerative Arthritis Left Hip? POSTOPERATIVE DIAGNOSIS: Degenerative Arthritis Left Hip? SECONDARY DIAGNOSIS: None? OPERATION PERFORMED: Left total hip arthroplasty? SURGEON: Harpal Unger? ASSISTANT(S): 1st: Gala Leal? YOB: 1965? ANESTHESIA: Spinal and Epidural?BLOOD LOSS: 200? FLUIDS: 1800? COMPLICATIONS: None? FINDINGS: Degenerative Arthritis? SPECIMENS: Left femoral head? IMPLANTS: ?Femoral Stem: DJO - Exprt Revision Distal Stem ([id number], Lot#: \[id number]\)? Femoral Head: DJO - Delta Ceramic Head (28mm, -3.5, Lot#: \[id number]\)? Acetabular Cup: DJO - EMPOWR Acetabular Revision Multi-Hole Cup with P2 Coating (Cementless, 52mm with dome hole pug, Lot#: \[id number]\)? DJO Surgical - Empor Dual Mobility (Metal Liner 52 Cup, 42 Poly Bearing, Lot#: \[id number]\) ? DJO Surgical - Emposer Dual Mobility (42mm Poly Bearing, Lot#: \[id number]\) ? DJO - Exprt Revision Proximal Body (65mm, Lateral Offset, Lot#: [id number]) ? ? ?BMI: 28.83  INDICATIONS: The patient is a 58-year-old woman whom has been having increasing pain and discomfort in her left hip. She was seen in the orthopedic clinic where radiographs demonstrated severe left hip degenerative arthritis. She has failed nonoperative measures and wishes to proceed with total hip arthroplasty. She is aware of the potential risks of surgery including but not limited to infection, bleeding, nerve damage, component loosening, failure to relieve her symptoms, loss of motion, persistent pain, instability, leg length inequality, and the need for additional surgery in the future.   She had extensive preoperative evaluation including intraarticular injection with complete resolution of symptoms. INTRAOPERATIVE FINDINGS: Intraoperatively, the patient demonstrated Degenerative Arthritis to her left hip. She had eburnated bone throughout the acetabulum as well as femoral head. She also demonstrated labral tears. Bone stock was appropriate for her stated age. However, she had a very wide endosteal femoral canal so that a modular tapered stem was utilized. A cementless acetabular and femoral component was able to be placed in stable position. PROCEDURE: The patient was admitted to the same day surgery program on Monday, February 6, 2023. Following proper identification in the preoperative holding area with confirmation of the above-stated procedure, the patient was brought to the main operating room suite. The above-stated procedure was confirmed. She received 2 grams of Cefazolin for antibiotic prophylaxis. The above stated anesthesia was administered. She was placed in the left lateral decubitus position. Care was taken to pad all bony prominences. The left lower extremity was prepped and draped in the usual sterile fashion. Following demonstration of adequate anesthesia, a surgical incision was made in the patient's left hip along the posterior border of the femur. The epidermis and dermis were incised down to the level of the subcutaneous fat. Blunt dissection was used to identify the underlying plane of the iliotibial band. The iliotibial band was split in line with the surgical incision, extending this proximally to the gluteus anderson fascia. This plane was retracted with the use of a Charnley bow. The posterior aspect of the abductors were identified. This was retracted anteriorly. The piriformis was identified and tagged. A posterior capsulotomy was made. The femoral head was dislocated posteriorly.  A femoral neck osteotomy was made approximately 3 mm proximal to the level of the lesser trochanter as determined from preoperative templating. Attention was turned to preparation of the acetabulum. The acetabulum was exposed with an anterior-superior as well as a posterior-inferior acetabular retractor. The cotyloid body was removed along with the acetabular labrum. Reaming was initiated, reaming sequentially to a final reaming of 52 mm. The 52 mm reamer engaged the anterior and posterior column as well as the superior dome. Subsequently, a 52 mm EMPOWR Acetabular Revision Multi-Hole Cup with P2 Coating acetabular component was placed in 45 degrees of verticality and 20 degrees of anteversion. This was seated and gave excellent initial stability. 2 Screws were used. Subsequently, a 52/42 dual mobility liner was inserted. Attention was now turned to the femur. The femur was exposed with a posterior femoral elevator. Intramedullary access was gained with a tapered reamer. Due to the size of the endosteal canal, a decision was made to use distal fixation. Reamers were used sequentially to ream to a final size of 22. Subsequently, a 22mm x 130 mm tapered stem was inserted. This gave excellent axial as well as rotational stability. The proximal body was prepared to accept a 65 mm lateral body. A trial reduction was performed with a 28/42 mm, -3.5 mm offset ball. The hip was reduced. It was brought through a stable range of motion without evidence of impingement or instability. Leg lengths appeared to be symmetric based on patellar height. The trial components were removed. A 65 mm Lateral offset body was placed in approimately 15 degrees of anterversion and a size 42/ 28 mm -3.5 mm offset polyethylene ball was attached. The hip was subsequently re-reduced. The anterior capsule was then reapproximated to the posterior capsule with interrupted #1 Vicryl suture. The piriformis was reapproximated to the posterior aspect of the abductors with interrupted #1 Vicryl suture.  The iliotibial band and gluteus anderson fascia were closed with interrupted #1 Vicryl suture. The subcutaneous tissue was closed with 3-0 Vicryl, and the skin was closed with Monocryl #3-0. The wounds were dressed with Aquacel and Medipore tape was applied. She was transferred to her previous hospital bed and brought to the recovery room in stable condition. All counts were correct x2. There appeared to be no intraoperative complications. Postoperatively, she will be weight bearing as tolerated. She will work with physical therapy. She will be on deep venous thrombosis prophlyaxis for the next four weeks and iraida-operative antibiotics for the next 24 hours. DICTATED BY: Mane Meyer? DATE: 2023-02-06? SIGNED: 2023-02-06

## 2023-02-06 NOTE — INTERVAL H&P NOTE
Pre-op Diagnosis: Left hip degenerative disease    The above referenced H&P was reviewed by WILL Reynoso on 2/6/2023, the patient was examined and no significant changes have occurred in the patient's condition since the H&P was performed. I discussed with the patient and/or legal representative the potential benefits, risks and side effects of this procedure; the likelihood of the patient achieving goals; and potential problems that might occur during recuperation. I discussed reasonable alternatives to the procedure, including risks, benefits and side effects related to the alternatives and risks related to not receiving this procedure. We will proceed with procedure as planned.     Yajaira Barclay PA-C  Physician Assistant for Dr. Jamie Baumann: [phone]  F: [phone]  C: [phone]

## 2023-02-07 VITALS
OXYGEN SATURATION: 100 % | RESPIRATION RATE: 18 BRPM | BODY MASS INDEX: 28.88 KG/M2 | HEART RATE: 86 BPM | SYSTOLIC BLOOD PRESSURE: 122 MMHG | HEIGHT: 67 IN | DIASTOLIC BLOOD PRESSURE: 78 MMHG | WEIGHT: 184 LBS | TEMPERATURE: 98 F

## 2023-02-07 PROCEDURE — 97110 THERAPEUTIC EXERCISES: CPT

## 2023-02-07 PROCEDURE — 97116 GAIT TRAINING THERAPY: CPT

## 2023-02-07 NOTE — DISCHARGE PLANNING
Patient cleared for discharge by ortho and medicine. After visit summary reviewed with patient and spouse; both verbalized understanding of all instructions. Personal belongings sent with patient (walker, gel ice packs provided), patient discharged to home in stable condition in care of spouse.

## 2023-02-07 NOTE — PLAN OF CARE
Pt is A&Ox4. RA. Tolerating general diet. Eliquis, Aldo bell, & scds for dvt prophylaxis. Right arm precautions. Voiding freely. PRN norco & Robaxin for pain management. Up by 1 assist with a walker. Abtx administered. Call light within reach frequent monitoring. Safety measures in place. When medically clear plan for Home with HH.      Problem: Patient Centered Care  Goal: Patient preferences are identified and integrated in the patient's plan of care  Description: Interventions:  - What would you like us to know as we care for you?  - Provide timely, complete, and accurate information to patient/family  - Incorporate patient and family knowledge, values, beliefs, and cultural backgrounds into the planning and delivery of care  - Encourage patient/family to participate in care and decision-making at the level they choose  - Honor patient and family perspectives and choices  Outcome: Progressing     Problem: PAIN - ADULT  Goal: Verbalizes/displays adequate comfort level or patient's stated pain goal  Description: INTERVENTIONS:  - Encourage pt to monitor pain and request assistance  - Assess pain using appropriate pain scale  - Administer analgesics based on type and severity of pain and evaluate response  - Implement non-pharmacological measures as appropriate and evaluate response  - Consider cultural and social influences on pain and pain management  - Manage/alleviate anxiety  - Utilize distraction and/or relaxation techniques  - Monitor for opioid side effects  - Notify MD/LIP if interventions unsuccessful or patient reports new pain  - Anticipate increased pain with activity and pre-medicate as appropriate  Outcome: Progressing     Problem: RISK FOR INFECTION - ADULT  Goal: Absence of fever/infection during anticipated neutropenic period  Description: INTERVENTIONS  - Monitor WBC  - Administer growth factors as ordered  - Implement neutropenic guidelines  Outcome: Progressing     Problem: SAFETY ADULT - FALL  Goal: Free from fall injury  Description: INTERVENTIONS:  - Assess pt frequently for physical needs  - Identify cognitive and physical deficits and behaviors that affect risk of falls.   - [place] fall precautions as indicated by assessment.  - Educate pt/family on patient safety including physical limitations  - Instruct pt to call for assistance with activity based on assessment  - Modify environment to reduce risk of injury  - Provide assistive devices as appropriate  - Consider OT/PT consult to assist with strengthening/mobility  - Encourage toileting schedule  Outcome: Progressing     Problem: DISCHARGE PLANNING  Goal: Discharge to home or other facility with appropriate resources  Description: INTERVENTIONS:  - Identify barriers to discharge w/pt and caregiver  - Include patient/family/discharge partner in discharge planning  - Arrange for needed discharge resources and transportation as appropriate  - Identify discharge learning needs (meds, wound care, etc)  - Arrange for interpreters to assist at discharge as needed  - Consider post-discharge preferences of patient/family/discharge partner  - Complete POLST form as appropriate  - Assess patient's ability to be responsible for managing their own health  - Refer to Case Management Department for coordinating discharge planning if the patient needs post-hospital services based on physician/LIP order or complex needs related to functional status, cognitive ability or social support system  Outcome: Progressing

## 2023-02-07 NOTE — PHYSICAL THERAPY NOTE
PHYSICAL THERAPY TREATMENT NOTE - INPATIENT     Room Number: 427/427-A       Presenting Problem: s/p left FERNANDA 2/6/23    Problem List  Principal Problem:    Arthritis of left hip  Active Problems:    History of tobacco abuse    Aortic root dilatation (HCC)    Other specified hypothyroidism    Hiatal hernia with gastroesophageal reflux disease and esophagitis    Essential hypertension    Status post left hip replacement      PHYSICAL THERAPY ASSESSMENT   Chart reviewed. RN  approved participation in physical therapy. PPE worn by therapist: mask, gloves and goggles. Patient was wearing a mask during session. Patient presented in bed with 6/10 pain. Patient with good  progress towards goals during this session. Education provided on Total Hip precautions, Weight bearing status, Physical therapy plan of care and physiological benefits of out of bed mobility. Patient with good carryover. Bed mobility: Min assist  Transfers: Min assist  Gait Assistance: Contact guard assist  Distance (ft): 2 x 100  Assistive Device: Rolling walker  Pattern: L Decreased stance time          Pt seen daily. Chart reviewed;RN approved pt participation in PT RX. Min a for bed mobility and transfer. EOB sitting balance activity with emphasis on core stabilization. Hip precautions reviewed;education;pt recall 3/3 spinal precautions. Family present;family education;all questions and concerns addressed. Pt amb 2 x 100 ft with RW and CGa;cuing for gait pattern as well as for postural awareness. Ther ex. Navigated 8 stairs with CGa. Patient was left in bedside chair at end of session with all needs in reach. The patient's Approx Degree of Impairment: 46.58% has been calculated based on documentation in the [facility] '6 clicks' Inpatient Basic Mobility Short Form. Research supports that patients with this level of impairment may benefit from Home with home health PT.  RN aware of patient status post session.     DISCHARGE RECOMMENDATIONS  PT Discharge Recommendations: Home with home health PT     PLAN  PT Treatment Plan: Bed mobility; Endurance; Energy conservation;Gait training    SUBJECTIVE  Pt reports being ready for PT RX    OBJECTIVE  Precautions: FERNANDA - posterior    WEIGHT BEARING RESTRICTION  Weight Bearing Restriction: L lower extremity           L Lower Extremity: Weight Bearing as Tolerated    PAIN ASSESSMENT   Ratin  Location: left hip  Management Techniques: Activity promotion    BALANCE                                                                                                                       Static Sitting: Good  Dynamic Sitting: Good           Static Standing: Poor +  Dynamic Standing: Poor +    ACTIVITY TOLERANCE                         O2 WALK       AM-PAC '6-Clicks' INPATIENT SHORT FORM - BASIC MOBILITY  How much difficulty does the patient currently have. .. Patient Difficulty: Turning over in bed (including adjusting bedclothes, sheets and blankets)?: A Little   Patient Difficulty: Sitting down on and standing up from a chair with arms (e.g., wheelchair, bedside commode, etc.): A Little   Patient Difficulty: Moving from lying on back to sitting on the side of the bed?: A Little   How much help from another person does the patient currently need. .. Help from Another: Moving to and from a bed to a chair (including a wheelchair)?: A Little   Help from Another: Need to walk in hospital room?: A Little   Help from Another: Climbing 3-5 steps with a railing?: A Little     AM-PAC Score:  Raw Score: 18   Approx Degree of Impairment: 46.58%   Standardized Score (AM-PAC Scale): 43.63   CMS Modifier (G-Code): CK      Additional information:     THERAPEUTIC EXERCISES  Lower Extremity Ankle pumps  Glut sets  Heel slides  Quad sets     Position Supine       Patient End of Session: Up in chair;Call light within reach;RN aware of session/findings;Bracing education provided per handout; All patient questions and concerns addressed    CURRENT GOALS Patient Goal Patient's self-stated goal is: to go home with home PT   Goal #1 Patient is able to demonstrate supine - sit EOB @ level: modified independent   Goal #1   Current Status Min a   Goal #2 Patient is able to demonstrate transfers Sit to/from Stand at assistance level: supervision     Goal #2  Current Status Min a   Goal #3 Patient is able to ambulate 150 feet with assistive device at assistance level: supervision   Goal #3   Current Status Pt amb 2 x 100 ft with RW and CGA   Goal #4 Patient will negotiate 2 stairs/one curb w/ assistive device and supervision   Goal #4   Current Status Navigated 8 stairs with CGA   Goal #5 Patient verbalizes and/or demonstrates all precautions and safety concerns independently   Goal #5   Current Status In progress   Goal #6 Patient independently performs home exercise program for ROM/strengthening per the instructions provided in preparation for discharge. Goal #6  Current Status In progress   Total PT session-30 min   -> gait 15 minutes and there ex-15 minutes.

## 2023-02-07 NOTE — CM/SW NOTE
Interval History: Patient seen and examined at bedside. IV fluids discontinued due to fluid overload.     Review of Systems   Unable to perform ROS: Mental status change     Objective:     Vital Signs (Most Recent):  Temp: 98.4 °F (36.9 °C) (06/21/17 1505)  Pulse: 74 (06/21/17 1506)  Resp: 16 (06/21/17 1505)  BP: (!) 164/73 (06/21/17 1505)  SpO2: 96 % (06/21/17 1505) Vital Signs (24h Range):  Temp:  [97.1 °F (36.2 °C)-98.4 °F (36.9 °C)] 98.4 °F (36.9 °C)  Pulse:  [52-80] 74  Resp:  [16-20] 16  SpO2:  [91 %-100 %] 96 %  BP: (132-183)/(60-82) 164/73     Weight: 102.1 kg (225 lb 1.4 oz)  Body mass index is 38.64 kg/m².    Intake/Output Summary (Last 24 hours) at 06/21/17 1854  Last data filed at 06/21/17 1254   Gross per 24 hour   Intake             1440 ml   Output                0 ml   Net             1440 ml      Physical Exam   Constitutional: She appears well-developed and well-nourished.   Cardiovascular: Normal rate, regular rhythm and normal heart sounds.  Exam reveals no gallop and no friction rub.    No murmur heard.  Pulmonary/Chest: Effort normal and breath sounds normal. No respiratory distress. She has no wheezes. She has no rales.   Abdominal: Soft. Bowel sounds are normal. She exhibits no distension. There is no tenderness.   Musculoskeletal: Normal range of motion.   Neurological: She is alert.   Follows commands. AO x 2 - person and place    Skin: Skin is warm and dry.       Significant Labs:   CBC:   Recent Labs  Lab 06/20/17  0426 06/21/17  0415   WBC 5.11 4.56   HGB 9.0* 8.2*   HCT 28.6* 26.2*    213     CMP:   Recent Labs  Lab 06/20/17  0426 06/21/17  0415    141   K 4.9 4.8   * 112*   CO2 21* 22*   GLU 97 102   BUN 22* 22*   CREATININE 1.3 1.6*   CALCIUM 8.3* 8.2*   PROT 5.9* 5.6*   ALBUMIN 2.6* 2.5*   BILITOT 0.3 0.2   ALKPHOS 94 87   AST 25 17   ALT 13 12   ANIONGAP 7* 7*   EGFRNONAA 45.3* 35.3*       Significant Imaging: I have reviewed all pertinent imaging results/findings  Saul spoke with pt re dc planning. Pt sts she will dc to home with her  who will be with her x 2weeks. Pt sts [facility] ref was already sent by MD office. SAUL called Marjan with [facility] [phone] to confirm. Per Marjan they don not need any documentaton at this time, pt is scheduled to be seen this Thursday. If additional info is needed Marjan will contact MD office. Plan  Home with [facility]. / to remain available for support and/or discharge planning.      Charu Duvall RN    Ext [phone] within the past 24 hours.

## 2023-02-07 NOTE — PLAN OF CARE
Problem: Patient Centered Care  Goal: Patient preferences are identified and integrated in the patient's plan of care  Description: Interventions:  - What would you like us to know as we care for you? I have had gastric bypass surgery 20 years ago and a stomach fustuIa repair recently, so I cannot have NSAIDS.  I would like to be medicated well for pain control.  - Provide timely, complete, and accurate information to patient/family  - Incorporate patient and family knowledge, values, beliefs, and cultural backgrounds into the planning and delivery of care  - Encourage patient/family to participate in care and decision-making at the level they choose  - Honor patient and family perspectives and choices  Outcome: Progressing     Problem: PAIN - ADULT  Goal: Verbalizes/displays adequate comfort level or patient's stated pain goal  Description: INTERVENTIONS:  - Encourage pt to monitor pain and request assistance  - Assess pain using appropriate pain scale  - Administer analgesics based on type and severity of pain and evaluate response  - Implement non-pharmacological measures as appropriate and evaluate response  - Consider cultural and social influences on pain and pain management  - Manage/alleviate anxiety  - Utilize distraction and/or relaxation techniques  - Monitor for opioid side effects  - Notify MD/LIP if interventions unsuccessful or patient reports new pain  - Anticipate increased pain with activity and pre-medicate as appropriate  Outcome: Progressing     Problem: RISK FOR INFECTION - ADULT  Goal: Absence of fever/infection during anticipated neutropenic period  Description: INTERVENTIONS  - Monitor WBC  - Administer growth factors as ordered  - Implement neutropenic guidelines  Outcome: Progressing     Problem: SAFETY ADULT - FALL  Goal: Free from fall injury  Description: INTERVENTIONS:  - Assess pt frequently for physical needs  - Identify cognitive and physical deficits and behaviors that affect risk of falls. - [place] fall precautions as indicated by assessment.  - Educate pt/family on patient safety including physical limitations  - Instruct pt to call for assistance with activity based on assessment  - Modify environment to reduce risk of injury  - Provide assistive devices as appropriate  - Consider OT/PT consult to assist with strengthening/mobility  - Encourage toileting schedule  Outcome: Progressing     Problem: DISCHARGE PLANNING  Goal: Discharge to home or other facility with appropriate resources  Description: INTERVENTIONS:  - Identify barriers to discharge w/pt and caregiver  - Include patient/family/discharge partner in discharge planning  - Arrange for needed discharge resources and transportation as appropriate  - Identify discharge learning needs (meds, wound care, etc)  - Arrange for interpreters to assist at discharge as needed  - Consider post-discharge preferences of patient/family/discharge partner  - Complete POLST form as appropriate  - Assess patient's ability to be responsible for managing their own health  - Refer to Case Management Department for coordinating discharge planning if the patient needs post-hospital services based on physician/LIP order or complex needs related to functional status, cognitive ability or social support system  Outcome: Progressing   Patient was able to walk in room and hallway. Voided up in the bathroom. Left hip dressing c/d/I. Pain clinic contacted and pain meds doses increased. Pain presently under control.

## 2024-01-24 ENCOUNTER — ORDER TRANSCRIPTION (OUTPATIENT)
Dept: ADMINISTRATIVE | Facility: HOSPITAL | Age: 59
End: 2024-01-24

## 2024-01-24 DIAGNOSIS — I25.10 CORONARY ARTERY DISEASE INVOLVING NATIVE CORONARY ARTERY WITHOUT ANGINA PECTORIS: ICD-10-CM

## 2024-01-24 DIAGNOSIS — R94.31 ABNORMAL EKG: Primary | ICD-10-CM

## 2024-01-24 DIAGNOSIS — I77.810 AORTIC ROOT DILATATION (HCC): ICD-10-CM

## 2024-02-23 NOTE — IMAGING NOTE
Pt verbalized understanding  regarding  instructions given in preparation for gated study procedure:  Check-in in the [facility] out-patient registration at 0845  Hold caffeine, decaf drink  and chocolate 12 hours prior. LIZA

## 2024-02-27 ENCOUNTER — HOSPITAL ENCOUNTER (OUTPATIENT)
Dept: CT IMAGING | Facility: HOSPITAL | Age: 59
Discharge: HOME | End: 2024-02-27
Attending: INTERNAL MEDICINE
Payer: MEDICARE

## 2024-02-27 VITALS — SYSTOLIC BLOOD PRESSURE: 108 MMHG | HEART RATE: 56 BPM | DIASTOLIC BLOOD PRESSURE: 68 MMHG | RESPIRATION RATE: 13 BRPM

## 2024-02-27 DIAGNOSIS — I77.810 AORTIC ROOT DILATATION (HCC): ICD-10-CM

## 2024-02-27 DIAGNOSIS — I25.10 CORONARY ARTERY DISEASE INVOLVING NATIVE CORONARY ARTERY WITHOUT ANGINA PECTORIS: ICD-10-CM

## 2024-02-27 DIAGNOSIS — R94.31 ABNORMAL EKG: ICD-10-CM

## 2024-02-27 PROCEDURE — 75574 CT ANGIO HRT W/3D IMAGE: CPT | Performed by: INTERNAL MEDICINE

## 2024-02-27 RX ORDER — NITROGLYCERIN 0.4 MG/1
TABLET SUBLINGUAL
Status: COMPLETED
Start: 2024-02-27 | End: 2024-02-27

## 2024-02-27 RX ADMIN — NITROGLYCERIN 0.4 MG: 0.4 TABLET SUBLINGUAL at 09:44:00

## 2024-02-27 NOTE — IMAGING NOTE
0910- Pt A+O x4. Procedure explained and questions answered.  Pt denies long-acting nitrates.  Pt ambulatory and assisted to CT table.  O2 at 2L NC.  See flowsheet for VS.    CT tech Tarazo     Contrast= 85  Saline= 62  Average HR= 54    Pt tolerated scan well.  Denies S/S of contrast reaction.  IV d/c'd at 0950.  Cath intact, bleeding controlled.  Pt ambulatory and escorted to changing room for discharge home.

## 2024-05-06 ENCOUNTER — TELEPHONE (OUTPATIENT)
Dept: FAMILY MEDICINE CLINIC | Facility: CLINIC | Age: 59
End: 2024-05-06

## 2024-05-06 NOTE — TELEPHONE ENCOUNTER
06/20/2024 ALIF L5-S1 w/Marcus at [facility]    Cardiologist- Dr. Garcia  [phone]  [phone]   for RN to call back, Card Clx form received but risk for surgery part was empty, they only commented on holding ASA.  05/10/24      Pulmonology: Giselle Rod (Last seen 02/15/2024)

## 2024-06-03 ENCOUNTER — TELEPHONE (OUTPATIENT)
Dept: FAMILY MEDICINE CLINIC | Facility: CLINIC | Age: 59
End: 2024-06-03

## 2024-06-03 NOTE — TELEPHONE ENCOUNTER
L5-S1 ALIF on 06/20/24 with Dr. Velazquez @ [facility]    H&P- completed 06/04/24  Labs- AST (39), MRSA neg, all other labs WNL  EKG- normal sinus rhythm, low voltage QRS, consider pulmonary disease, pericardial effusion, or normal variant. Possible inferior infarct. Previous EKG done 01/25/23 in Cardio tab.  X-ray- chronic wedging midthoracic vertebra and accentuated thoracic kyphosis.    Last saw JA 01/25/23    Cardio- Dr. Julia Luis  Clearance scanned into Media 06/04/24:  \"Ms. Baldwin is at acceptable risk for this procedure from a cardiac standpoint.\"

## 2024-06-04 ENCOUNTER — OFFICE VISIT (OUTPATIENT)
Dept: FAMILY MEDICINE CLINIC | Facility: CLINIC | Age: 59
End: 2024-06-04
Payer: MEDICARE

## 2024-06-04 ENCOUNTER — LAB ENCOUNTER (OUTPATIENT)
Dept: LAB | Facility: HOSPITAL | Age: 59
End: 2024-06-04
Attending: FAMILY MEDICINE
Payer: MEDICARE

## 2024-06-04 ENCOUNTER — HOSPITAL ENCOUNTER (OUTPATIENT)
Dept: GENERAL RADIOLOGY | Facility: HOSPITAL | Age: 59
Discharge: HOME | End: 2024-06-04
Attending: FAMILY MEDICINE
Payer: MEDICARE

## 2024-06-04 VITALS
WEIGHT: 198 LBS | SYSTOLIC BLOOD PRESSURE: 102 MMHG | HEIGHT: 67 IN | OXYGEN SATURATION: 98 % | DIASTOLIC BLOOD PRESSURE: 70 MMHG | HEART RATE: 100 BPM | BODY MASS INDEX: 31.08 KG/M2 | RESPIRATION RATE: 16 BRPM | TEMPERATURE: 97 F

## 2024-06-04 DIAGNOSIS — Z90.721 S/P HYSTERECTOMY WITH OOPHORECTOMY: ICD-10-CM

## 2024-06-04 DIAGNOSIS — Z98.84 S/P GASTRIC BYPASS: ICD-10-CM

## 2024-06-04 DIAGNOSIS — E03.8 OTHER SPECIFIED HYPOTHYROIDISM: ICD-10-CM

## 2024-06-04 DIAGNOSIS — Z96.642 STATUS POST LEFT HIP REPLACEMENT: ICD-10-CM

## 2024-06-04 DIAGNOSIS — M79.7 FIBROMYALGIA: ICD-10-CM

## 2024-06-04 DIAGNOSIS — Z87.891 HISTORY OF TOBACCO ABUSE: ICD-10-CM

## 2024-06-04 DIAGNOSIS — G89.4 CHRONIC PAIN SYNDROME: ICD-10-CM

## 2024-06-04 DIAGNOSIS — S00.06XS TICK BITE OF SCALP, SEQUELA: ICD-10-CM

## 2024-06-04 DIAGNOSIS — Z01.818 PREOPERATIVE EXAMINATION, UNSPECIFIED: ICD-10-CM

## 2024-06-04 DIAGNOSIS — K21.00 HIATAL HERNIA WITH GASTROESOPHAGEAL REFLUX DISEASE AND ESOPHAGITIS: ICD-10-CM

## 2024-06-04 DIAGNOSIS — Z01.812 PRE-OPERATIVE LABORATORY EXAMINATION: ICD-10-CM

## 2024-06-04 DIAGNOSIS — M47.812 CERVICAL SPONDYLOSIS: ICD-10-CM

## 2024-06-04 DIAGNOSIS — R06.02 SHORTNESS OF BREATH: ICD-10-CM

## 2024-06-04 DIAGNOSIS — Z86.69 HISTORY OF MIGRAINE: ICD-10-CM

## 2024-06-04 DIAGNOSIS — M48.062 SPINAL STENOSIS OF LUMBAR REGION WITH NEUROGENIC CLAUDICATION: Primary | ICD-10-CM

## 2024-06-04 DIAGNOSIS — I89.0 LYMPHEDEMA: ICD-10-CM

## 2024-06-04 DIAGNOSIS — Z90.710 S/P HYSTERECTOMY WITH OOPHORECTOMY: ICD-10-CM

## 2024-06-04 DIAGNOSIS — G89.29 CHRONIC LEFT SI JOINT PAIN: ICD-10-CM
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
## 2024-06-04 DIAGNOSIS — G47.33 OSA ON CPAP: ICD-10-CM

## 2024-06-04 DIAGNOSIS — K44.9 HIATAL HERNIA WITH GASTROESOPHAGEAL REFLUX DISEASE AND ESOPHAGITIS: ICD-10-CM

## 2024-06-04 DIAGNOSIS — F41.9 ANXIETY: ICD-10-CM

## 2024-06-04 DIAGNOSIS — I25.10 CORONARY ARTERY DISEASE INVOLVING NATIVE CORONARY ARTERY OF NATIVE HEART WITHOUT ANGINA PECTORIS: ICD-10-CM

## 2024-06-04 DIAGNOSIS — M53.3 CHRONIC LEFT SI JOINT PAIN: ICD-10-CM

## 2024-06-04 DIAGNOSIS — G50.0 TRIGEMINAL NEURALGIA: ICD-10-CM

## 2024-06-04 DIAGNOSIS — Z85.3 HISTORY OF BILATERAL BREAST CANCER: ICD-10-CM

## 2024-06-04 DIAGNOSIS — I10 ESSENTIAL HYPERTENSION: ICD-10-CM

## 2024-06-04 DIAGNOSIS — I77.810 AORTIC ROOT DILATATION (HCC): ICD-10-CM

## 2024-06-04 LAB
ALBUMIN SERPL-MCNC: 4.8 G/DL (ref 3.2–4.8)
ALBUMIN/GLOB SERPL: 1.8 {RATIO} (ref 1–2)
ALP LIVER SERPL-CCNC: 67 U/L
ALT SERPL-CCNC: 35 U/L
ANION GAP SERPL CALC-SCNC: 7 MMOL/L (ref 0–18)
ANTIBODY SCREEN: NEGATIVE
APTT PPP: 25.3 SECONDS (ref 23–36)
AST SERPL-CCNC: 39 U/L (ref ?–34)
ATRIAL RATE: 67 BPM
BASOPHILS # BLD AUTO: 0.01 X10(3) UL (ref 0–0.2)
BASOPHILS NFR BLD AUTO: 0.2 %
BILIRUB SERPL-MCNC: 0.6 MG/DL (ref 0.3–1.2)
BILIRUB UR QL: NEGATIVE
BUN BLD-MCNC: 11 MG/DL (ref 9–23)
BUN/CREAT SERPL: 13.9 (ref 10–20)
CALCIUM BLD-MCNC: 9.6 MG/DL (ref 8.7–10.4)
CHLORIDE SERPL-SCNC: 105 MMOL/L (ref 98–112)
CLARITY UR: CLEAR
CO2 SERPL-SCNC: 26 MMOL/L (ref 21–32)
CREAT BLD-MCNC: 0.79 MG/DL
DEPRECATED RDW RBC AUTO: 42.4 FL (ref 35.1–46.3)
EGFRCR SERPLBLD CKD-EPI 2021: 86 ML/MIN/1.73M2 (ref 60–?)
EOSINOPHIL # BLD AUTO: 0.17 X10(3) UL (ref 0–0.7)
EOSINOPHIL NFR BLD AUTO: 2.8 %
ERYTHROCYTE [DISTWIDTH] IN BLOOD BY AUTOMATED COUNT: 12.2 % (ref 11–15)
FASTING STATUS PATIENT QL REPORTED: YES
GLOBULIN PLAS-MCNC: 2.7 G/DL (ref 2–3.5)
GLUCOSE BLD-MCNC: 91 MG/DL (ref 70–99)
GLUCOSE UR-MCNC: NORMAL MG/DL
HCT VFR BLD AUTO: 41.7 %
HGB BLD-MCNC: 14.2 G/DL
HGB UR QL STRIP.AUTO: NEGATIVE
IMM GRANULOCYTES # BLD AUTO: 0.03 X10(3) UL (ref 0–1)
IMM GRANULOCYTES NFR BLD: 0.5 %
INR BLD: 0.92 (ref 0.8–1.2)
KETONES UR-MCNC: NEGATIVE MG/DL
LEUKOCYTE ESTERASE UR QL STRIP.AUTO: NEGATIVE
LYMPHOCYTES # BLD AUTO: 1.71 X10(3) UL (ref 1–4)
LYMPHOCYTES NFR BLD AUTO: 27.7 %
MCH RBC QN AUTO: 32.1 PG (ref 26–34)
MCHC RBC AUTO-ENTMCNC: 34.1 G/DL (ref 31–37)
MCV RBC AUTO: 94.1 FL
MONOCYTES # BLD AUTO: 0.45 X10(3) UL (ref 0.1–1)
MONOCYTES NFR BLD AUTO: 7.3 %
NEUTROPHILS # BLD AUTO: 3.8 X10 (3) UL (ref 1.5–7.7)
NEUTROPHILS # BLD AUTO: 3.8 X10(3) UL (ref 1.5–7.7)
NEUTROPHILS NFR BLD AUTO: 61.5 %
NITRITE UR QL STRIP.AUTO: NEGATIVE
OSMOLALITY SERPL CALC.SUM OF ELEC: 285 MOSM/KG (ref 275–295)
P AXIS: -16 DEGREES
P-R INTERVAL: 198 MS
PH UR: 7 [PH] (ref 5–8)
PLATELET # BLD AUTO: 238 10(3)UL (ref 150–450)
POTASSIUM SERPL-SCNC: 4 MMOL/L (ref 3.5–5.1)
PROT SERPL-MCNC: 7.5 G/DL (ref 5.7–8.2)
PROT UR-MCNC: NEGATIVE MG/DL
PROTHROMBIN TIME: 12.9 SECONDS (ref 11.6–14.8)
Q-T INTERVAL: 402 MS
QRS DURATION: 66 MS
QTC CALCULATION (BEZET): 424 MS
R AXIS: 6 DEGREES
RBC # BLD AUTO: 4.43 X10(6)UL
RH BLOOD TYPE: POSITIVE
SODIUM SERPL-SCNC: 138 MMOL/L (ref 136–145)
SP GR UR STRIP: 1.01 (ref 1–1.03)
T AXIS: 12 DEGREES
UROBILINOGEN UR STRIP-ACNC: NORMAL
VENTRICULAR RATE: 67 BPM
WBC # BLD AUTO: 6.2 X10(3) UL (ref 4–11)

## 2024-06-04 PROCEDURE — 86900 BLOOD TYPING SEROLOGIC ABO: CPT | Performed by: FAMILY MEDICINE

## 2024-06-04 PROCEDURE — 99499 UNLISTED E&M SERVICE: CPT | Performed by: FAMILY MEDICINE

## 2024-06-04 PROCEDURE — 81003 URINALYSIS AUTO W/O SCOPE: CPT

## 2024-06-04 PROCEDURE — 93010 ELECTROCARDIOGRAM REPORT: CPT | Performed by: INTERNAL MEDICINE

## 2024-06-04 PROCEDURE — 71046 X-RAY EXAM CHEST 2 VIEWS: CPT | Performed by: FAMILY MEDICINE

## 2024-06-04 PROCEDURE — 80053 COMPREHEN METABOLIC PANEL: CPT

## 2024-06-04 PROCEDURE — 85025 COMPLETE CBC W/AUTO DIFF WBC: CPT

## 2024-06-04 PROCEDURE — 86850 RBC ANTIBODY SCREEN: CPT | Performed by: FAMILY MEDICINE

## 2024-06-04 PROCEDURE — 36415 COLL VENOUS BLD VENIPUNCTURE: CPT

## 2024-06-04 PROCEDURE — 87081 CULTURE SCREEN ONLY: CPT

## 2024-06-04 PROCEDURE — 85730 THROMBOPLASTIN TIME PARTIAL: CPT

## 2024-06-04 PROCEDURE — 85610 PROTHROMBIN TIME: CPT

## 2024-06-04 PROCEDURE — 86901 BLOOD TYPING SEROLOGIC RH(D): CPT | Performed by: FAMILY MEDICINE

## 2024-06-04 PROCEDURE — 93005 ELECTROCARDIOGRAM TRACING: CPT

## 2024-06-04 PROCEDURE — 99214 OFFICE O/P EST MOD 30 MIN: CPT | Performed by: FAMILY MEDICINE

## 2024-06-04 RX ORDER — HYDROCODONE BITARTRATE AND ACETAMINOPHEN 10; 325 MG/1; MG/1
1 TABLET ORAL EVERY 6 HOURS PRN
COMMUNITY

## 2024-06-04 RX ORDER — OMEPRAZOLE 40 MG/1
40 CAPSULE, DELAYED RELEASE ORAL DAILY
COMMUNITY

## 2024-06-04 NOTE — TELEPHONE ENCOUNTER
Dr. Collado, please review:    Labs- AST (39), MRSA neg, all other labs WNL  EKG- normal sinus rhythm, low voltage QRS, consider pulmonary disease, pericardial effusion, or normal variant. Possible inferior infarct. Previous EKG done 01/25/23 in Cardio tab.  X-ray- chronic wedging midthoracic vertebra and accentuated thoracic kyphosis.    Cardio- Dr. Julia Luis  Clearance scanned into Media 06/04/24:  \"Ms. Baldwin is at acceptable risk for this procedure from a cardiac standpoint.    OK for surgery?

## 2024-06-04 NOTE — H&P
[facility] PRE-OP CLINIC [place]    PRE-OP NOTE    HPI:   I have been consulted by Dr. Velazquez to see eDbi Baldwin 58 year old female for a preoperative evaluation and medical clearance. She has a long history of worsening severe degenerative arthritis and lumbar spinal stenosis . Patient is to have a l5-S1 anterior lumbar interbody fusion  by Dr. Velazquez  on 6/20/2024.     Pt suffers significant pain and loss of function.     She has no cardiopulmonary symptoms.      She has no history of  DVT. Denies tobacco use.      She is now on CPAP for BRANDI      History reviewed. No pertinent family history.   Current Outpatient Medications   Medication Sig Dispense Refill    HYDROcodone-acetaminophen  MG Oral Tab Take 1 tablet by mouth every 6 (six) hours as needed for Pain.      Omeprazole 40 MG Oral Capsule Delayed Release Take 1 capsule (40 mg total) by mouth daily.      docusate sodium 100 MG Oral Cap Take 1 capsule (100 mg total) by mouth 2 (two) times daily. 60 capsule 2    amLODIPine 5 MG Oral Tab Take 1.5 tablets (7.5 mg total) by mouth daily.      amoxicillin 500 MG Oral Cap Take 4 capsules by mouth, one hour before dental procedures      Omega Fatty Acids-Vitamins (OMEGA-3 GUMMIES) Oral Chew Tab Chew 3 each by mouth daily.      CALCIUM-MAG-VIT C-VIT D OR Take 1 tablet by mouth in the morning, at noon, and at bedtime.      cetirizine 10 MG Oral Tab Take 1 tablet (10 mg total) by mouth at bedtime.      Cyanocobalamin-Methylcobalamin 600-600 MCG Sublingual SL Tab Place 1,000 mcg under the tongue 3 (three) times a week. Monday, Wednesday, Friday      Denosumab 60 MG/ML Subcutaneous Solution Prefilled Syringe Inject 1 mL (60 mg total) into the skin every 6 (six) months.      liothyronine 5 MCG Oral Tab Take 1 tablet (5 mcg total) by mouth daily.      methocarbamol 750 MG Oral Tab Take 1 tablet (750 mg total) by mouth 2 (two) times daily as needed.      montelukast 10 MG Oral Tab Take 1 tablet (10 mg total) by mouth  nightly.      Multiple Vitamins-Minerals (MULTI-VITAMIN/MINERALS) Oral Tab Take 1 tablet by mouth daily.      Polyethylene Glycol 3350 (MIRALAX OR) Take 1 Dose by mouth 2 (two) times a day.      spironolactone 100 MG Oral Tab Take 1 tablet (100 mg total) by mouth daily.      SYNTHROID 75 MCG Oral Tab Take 1 tablet (75 mcg total) by mouth before breakfast. BRAND NAME ONLY       Past Medical History:    Anxiety    Aortic root dilatation (HCC)    Arthritis    Back problem    kyphosis    Cancer (HCC)    invasive ductal carcinoma- radiation therapy completed right    Coronary atherosclerosis    Depression    Disorder of thyroid    Environmental allergies    mold, dust, pollen    Essential hypertension    Exposure to medical diagnostic radiation    Hearing impairment    cochlear gina sclerosis    Heart attack (HCC)    Heart valve disease    High blood pressure    History of sleep apnea    has not used the CPAP since after gastric bypass surgery 2002 and 100# weight loss maintained    Hypoglycemia    Hypothyroidism    Lymphedema    involving L leg, R arm, and torso- wears compression sleeve and wraps    Mechanical and motor problems with neck and trunk    cervical spondylosis with myelopathy    Migraines    Musculoskeletal problem    cervical spine pain and occipital neuralgia. s/p nerve block on 10/21/19 with improvement and reports neck sensative to movements triggering pain    Occipital neuralgia    BRANDI (obstructive sleep apnea)    uses CPAP machine    Osteopenia    Sacroiliac dysfunction    SI joint fusion    Thoracic kyphosis    Thyroid nodule    stable    Tick bite    put on Doxy    Trigeminal neuralgia     Past Surgical History:   Procedure Laterality Date    Adenoidectomy      Appendectomy  2017    Appendectomy      Biopsy of breast, needle core Right 2019, 2020    Breast lumpectomy Right 01/2020    Breast surgery Bilateral 2020    breast reduction surgery    Bso, omentectomy w/brigid  2010    with panniculectomy           x1    Cervical spine surgery      s/p C5-6, 6-7 ACDF and discectomy    Cholecystectomy      Colonoscopy  ,     repeat in 5 years    Excision of uvula      Uvulopalatopharygoplasty    Gastric bypass,obesity,sb reconstruc      Hip replacement surgery Left 2023    Dr. Unger    Inner ear surgery proc unlisted Right     for cochlear otosclerosis- titanium ranjit placed    Iridotomy/iridectomy by laser      Laparoscopy, gastric restrictive procedure, with gastric bypass for morbid  2002    Needle biopsy, lymph node(s)      pelvic lymph node swelling    Other      Discogram 21    Pancreas surgery proc unlisted      biopsy of cyst in pacreas, now 2 cysts- stable    Radiation therapy treatment, onc (ext)      Sinus surgery        sinus surgery procedure ,     Spine surgery procedure unlisted      Tonsillectomy      Upper gi endoscopy,exam      Dr. Michelle done with EUS     Social History     Socioeconomic History    Marital status:      Spouse name: Not on file    Number of children: Not on file    Years of education: Not on file    Highest education level: Not on file   Occupational History    Not on file   Tobacco Use    Smoking status: Former     Current packs/day: 0.00     Types: Cigarettes     Start date:      Quit date:      Years since quittin.4     Passive exposure: Past    Smokeless tobacco: Never   Vaping Use    Vaping status: Never Used   Substance and Sexual Activity    Alcohol use: Not Currently    Drug use: Yes     Types: Cannabis     Comment: not currently    Sexual activity: Not on file   Other Topics Concern    Not on file   Social History Narrative    Not on file     Social Determinants of Health     Financial Resource Strain: Not on file   Food Insecurity: Low Risk  (2024)    Received from [facility], [facility]    Food Insecurity     Have there been times that your food ran out, and you  didn't have money to get more?: No     Are there times that you worry that this might happen?: No   Transportation Needs: Low Risk  (2/5/2024)    Received from [facility]    Transportation Needs     Do you have trouble getting transportation to medical appointments?: No     How do you normally get to and from your appointments?: Family/Friend   Physical Activity: Not on file   Stress: Not on file   Social Connections: Not on file   Housing Stability: Low Risk  (2/5/2024)    Received from [facility]    Housing Stability     Are you concerned about having a safe and reliable place to live?: No       REVIEW OF SYSTEMS:   CONSTITUTIONAL:  Denies unusual weight gain/loss, fever, chills  EENT:  Eyes:  Denies eye pain, visual loss, blurred vision, double vision. Ears, Nose, Throat:  Denies congestion, runny nose or sore throat.  INTEGUMENTARY:  Denies rashes, itching, skin lesion,   CARDIOVASCULAR:  Denies DVT. Denies chest pain, palpitations, edema, dyspnea  RESPIRATORY: She is on CPAP for  BRANDI ,Denies shortness of breath, wheezing, cough  GASTROINTESTINAL:  Denies abdominal pain, nausea, vomiting, constipation, diarrhea, or blood in stool.  MUSCULOSKELETAL: severe pain to her low back and left hip as noted above  NEUROLOGICAL:  Denies headache, seizures, dizziness, syncope, paralysis, ataxia,  HEMATOLOGIC:  Denies anemia, bleeding or bruising.  LYMPHATICS:  Denies enlarged nodes   PSYCHIATRIC:  Denies depression or anxiety.  ENDOCRINOLOGIC: DM 2 NO,   ALLERGIES:  Denies allergic response, history of asthma, hives,     EXAM:   /70 (BP Location: Left arm)   Pulse 100   Temp 97.2 °F (36.2 °C) (Temporal)   Resp 16   Ht 5' 7\" (1.702 m)   Wt 198 lb (89.8 kg)   SpO2 98%   BMI 31.01 kg/m²  Estimated body mass index is 31.01 kg/m² as calculated from the following:    Height as of this encounter: 5' 7\"  (1.702 m).    Weight as of this encounter: 198 lb (89.8 kg).   Vital signs reviewed.Appears stated age, well groomed.  Physical Exam:  GEN:  Patient is alert, awake and oriented, well developed, well nourished, no apparent distress.  HEENT:  Head:  Normocephalic, atraumatic Nose: patent, no nasal discharge  NECK: Supple, no CLAD, no carotid bruit, no thyromegaly.  SKIN: No rashes, no skin lesion, no bruising, good turgor.  HEART:  Regular rate and rhythm, no murmurs, rubs or gallops.  LUNGS: Clear to auscultation bilterally, no rales/rhonchi/wheezing.  CHEST: No tenderness.  ABDOMEN:  Soft, nondistended, nontender,  no masses, no hepatosplenomegaly.  BACK: low lumbar tenderness    EXTREMITIES:  No edema, no cyanosis, no clubbing,   NEURO:  No focal deficit, speech fluent, she has an antalgic  gait, strength and tone, sensory intact      Lab Results   Component Value Date    WBC 6.5 01/25/2023    HGB 13.1 02/06/2023    HCT 40.5 02/06/2023    .0 01/25/2023    CREATSERUM 0.70 02/06/2023    BUN 14 02/06/2023     02/06/2023    K 4.0 02/06/2023     02/06/2023    CO2 24.0 02/06/2023    GLU 95 02/06/2023    CA 8.9 02/06/2023    ALB 3.8 02/06/2023    ALKPHO 46 02/06/2023    BILT 0.2 02/06/2023    TP 6.8 02/06/2023    AST 23 02/06/2023    ALT 26 02/06/2023    PTT 26.2 05/11/2022    INR 0.94 05/11/2022       No results found.          ASSESSMENT AND PLAN:   Debi Baldwin is a 58 year old female, with a hx of severe degenerative arthritis and lumbar spinal stenosis  who presents for a pre-operative physical exam. Patient is to have a L5-S1 anterior lumbar interbody fusion  by Dr. Velazquez  on 6/20/2024.      ICD-10-CM    1. Spinal stenosis of lumbar region with neurogenic claudication  M48.062       2. Shortness of breath  R06.02 CBC With Differential With Platelet     Comp Metabolic Panel (14)     EKG 12 Lead     MSSA and MRSA Culture Screen     Prothrombin Time (PT)     PTT, Activated     XR CHEST PA + LAT  CHEST (CPT=71046)     Urinalysis with Culture Reflex     Type and screen      3. Aortic root dilatation (HCC)  I77.810       4. Coronary artery disease involving native coronary artery of native heart without angina pectoris S/P MI 2018  I25.10       5. Essential hypertension  I10       6. S/P gastric bypass  Z98.84       7. Other specified hypothyroidism  E03.8       8. Chronic pain syndrome  G89.4       9. Hiatal hernia with gastroesophageal reflux disease and esophagitis  K44.9     K21.00       10. Chronic left SI joint pain  M53.3     G89.29       11. Fibromyalgia  M79.7       12. Status post left hip replacement  Z96.642       13. History of tobacco abuse  Z87.891       14. Anxiety  F41.9       15. History of bilateral breast cancer 2020  Z85.3       16. BRANDI on CPAP  G47.33       17. Lymphedema to left leg and right arm  I89.0       18. Cervical spondylosis  M47.812       19. History of migraine  Z86.69       20. Trigeminal neuralgia  G50.0       21. Recent tick bite of scalp, sequela  S00.06XS     W57.XXXS       22. S/P hysterectomy with oophorectomy  Z90.710     Z90.721       23. BMI 31.0-31.9,adult S/P gastric bypass  Z68.31       24. Preoperative examination, unspecified  Z01.818 CBC With Differential With Platelet     Comp Metabolic Panel (14)     EKG 12 Lead     MSSA and MRSA Culture Screen     Prothrombin Time (PT)     PTT, Activated     XR CHEST PA + LAT CHEST (CPT=71046)     Urinalysis with Culture Reflex     Type and screen      25. Pre-operative laboratory examination  Z01.812 CBC With Differential With Platelet     Comp Metabolic Panel (14)     EKG 12 Lead     MSSA and MRSA Culture Screen     Prothrombin Time (PT)     PTT, Activated     XR CHEST PA + LAT CHEST (CPT=71046)     Urinalysis with Culture Reflex     Type and screen        Patient Active Problem List   Diagnosis    Chronic left SI joint pain    Chronic pain syndrome    Fibromyalgia    S/P gastric bypass    History of tobacco abuse     Aortic root dilatation (HCC)    Coronary artery disease involving native coronary artery without angina pectoris    Other specified hypothyroidism    Hiatal hernia with gastroesophageal reflux disease and esophagitis    Essential hypertension    Arthritis of left hip    History of revision of total knee arthroplasty    Status post left hip replacement        ECG and labs are pending review     Preoperative Risk Stratification: There are no decompensated medical conditions. ASA classification 3    Medical history:  High risk surgery (vascular, thoracic, intra-peritoneal): No  CAD: YES  CHF: YES  Stroke: No  DM on insulin: No  Serum Creatinine >2 mg/dl: No  Patient has an RCRI score that is moderate risk and is at moderate risk for major cardiac event in the perioperative period.     Patient is medically optimized and has an acceptable risk of surgery and may proceed with surgery as planned.     PLAN:    Patient to discontinue medications and supplements with anticoagulation properties as per instruction.        Postoperative Recommendations:    Anticoagulation / DVT prophylaxis: SCDs, early ambulation.  GI protection: Protonix  Incentive Spirometry   Telemetry as needed  CPAP/O2 as needed   DM: QID glucoscans and sliding scale insulin as needed   Renal protection: (hydration / NSAID and ACE/ARB avoidance)   Cognitive protection: (minimize narcotics, benzodiazepines, scopolamine)     Pain management and Physical therapy as per Orthopedic service.   Home Health as needed    Thank you for the opportunity to care for your patient and to assist in managing the postoperative course.        Liban Collado DO  6/4/2024  10:04 AM

## 2024-06-06 NOTE — TELEPHONE ENCOUNTER
Spoke to patient, went over test results and let her know she is clear for surgery per Dr. Collado.     She requested x-ray Dr. Jere Nicole [facility] pain management. Fax sent.  Fax# [phone]

## 2024-06-11 PROBLEM — G50.0 TRIGEMINAL NEURALGIA: Status: ACTIVE | Noted: 2024-06-11

## 2024-06-11 PROBLEM — I89.0 LYMPHEDEMA: Status: ACTIVE | Noted: 2024-06-11

## 2024-06-20 ENCOUNTER — ANESTHESIA (OUTPATIENT)
Dept: SURGERY | Facility: HOSPITAL | Age: 59
DRG: 455 | End: 2024-06-20
Payer: MEDICARE

## 2024-06-20 ENCOUNTER — HOSPITAL ENCOUNTER (INPATIENT)
Facility: HOSPITAL | Age: 59
LOS: 1 days | Discharge: HOME | DRG: 455 | End: 2024-06-21
Attending: ORTHOPAEDIC SURGERY | Admitting: ORTHOPAEDIC SURGERY
Payer: MEDICARE

## 2024-06-20 ENCOUNTER — ANESTHESIA EVENT (OUTPATIENT)
Dept: SURGERY | Facility: HOSPITAL | Age: 59
DRG: 455 | End: 2024-06-20
Payer: MEDICARE

## 2024-06-20 ENCOUNTER — APPOINTMENT (OUTPATIENT)
Dept: GENERAL RADIOLOGY | Facility: HOSPITAL | Age: 59
DRG: 455 | End: 2024-06-20
Attending: ORTHOPAEDIC SURGERY
Payer: MEDICARE

## 2024-06-20 DIAGNOSIS — Z98.1 S/P LUMBAR SPINAL FUSION: Primary | ICD-10-CM

## 2024-06-20 PROBLEM — M48.062 SPINAL STENOSIS OF LUMBAR REGION WITH NEUROGENIC CLAUDICATION: Status: ACTIVE | Noted: 2024-06-20

## 2024-06-20 PROCEDURE — 22558 ARTHRD ANT NTRBD MIN DSC LUM: CPT | Performed by: SURGERY

## 2024-06-20 PROCEDURE — 76000 FLUOROSCOPY <1 HR PHYS/QHP: CPT | Performed by: ORTHOPAEDIC SURGERY

## 2024-06-20 PROCEDURE — 4A11X4G MONITORING OF PERIPHERAL NERVOUS ELECTRICAL ACTIVITY, INTRAOPERATIVE, EXTERNAL APPROACH: ICD-10-PCS | Performed by: GENERAL ACUTE CARE HOSPITAL

## 2024-06-20 PROCEDURE — 0SB40ZZ EXCISION OF LUMBOSACRAL DISC, OPEN APPROACH: ICD-10-PCS | Performed by: ORTHOPAEDIC SURGERY

## 2024-06-20 PROCEDURE — 0SG30K1 FUSION OF LUMBOSACRAL JOINT WITH NONAUTOLOGOUS TISSUE SUBSTITUTE, POSTERIOR APPROACH, POSTERIOR COLUMN, OPEN APPROACH: ICD-10-PCS | Performed by: ORTHOPAEDIC SURGERY

## 2024-06-20 PROCEDURE — 0TN70ZZ RELEASE LEFT URETER, OPEN APPROACH: ICD-10-PCS | Performed by: SURGERY

## 2024-06-20 PROCEDURE — 50715 RELEASE OF URETER: CPT | Performed by: SURGERY

## 2024-06-20 PROCEDURE — 0SG30A0 FUSION OF LUMBOSACRAL JOINT WITH INTERBODY FUSION DEVICE, ANTERIOR APPROACH, ANTERIOR COLUMN, OPEN APPROACH: ICD-10-PCS | Performed by: ORTHOPAEDIC SURGERY

## 2024-06-20 DEVICE — K WIRE FIX NIT BVL BLNT TIP PRECEPT: Type: IMPLANTABLE DEVICE | Site: BACK

## 2024-06-20 DEVICE — ROD SPNL 5.5X40MM LORD TI RELINE MAS: Type: IMPLANTABLE DEVICE | Site: BACK | Status: FUNCTIONAL

## 2024-06-20 DEVICE — SCREW SPNL 6.5X40MM 2C REDUC POLYAX RELINE: Type: IMPLANTABLE DEVICE | Site: BACK | Status: FUNCTIONAL

## 2024-06-20 DEVICE — GRAFT BNE SUB MTRX C OSTEOCEL PRO: Type: IMPLANTABLE DEVICE | Status: FUNCTIONAL

## 2024-06-20 DEVICE — SCREW SPNL 6.5X45MM 2C REDUC POLYAX RELINE: Type: IMPLANTABLE DEVICE | Site: BACK | Status: FUNCTIONAL

## 2024-06-20 DEVICE — SCREW SPNL 5.5MM LOK OPN TULIP RELINE: Type: IMPLANTABLE DEVICE | Site: BACK | Status: FUNCTIONAL

## 2024-06-20 DEVICE — IMPLANTABLE DEVICE: Type: IMPLANTABLE DEVICE | Site: BACK | Status: FUNCTIONAL

## 2024-06-20 DEVICE — SCREW SPNL 5.5X22MM ANT THORLUM TI BRIGADE: Type: IMPLANTABLE DEVICE | Site: BACK | Status: FUNCTIONAL

## 2024-06-20 RX ORDER — ONDANSETRON 2 MG/ML
4 INJECTION INTRAMUSCULAR; INTRAVENOUS EVERY 6 HOURS PRN
Status: DISCONTINUED | OUTPATIENT
Start: 2024-06-20 | End: 2024-06-20 | Stop reason: HOSPADM

## 2024-06-20 RX ORDER — SODIUM CHLORIDE 9 MG/ML
INJECTION, SOLUTION INTRAVENOUS CONTINUOUS PRN
Status: DISCONTINUED | OUTPATIENT
Start: 2024-06-20 | End: 2024-06-20 | Stop reason: SURG

## 2024-06-20 RX ORDER — SODIUM CHLORIDE, SODIUM LACTATE, POTASSIUM CHLORIDE, CALCIUM CHLORIDE 600; 310; 30; 20 MG/100ML; MG/100ML; MG/100ML; MG/100ML
INJECTION, SOLUTION INTRAVENOUS CONTINUOUS
Status: DISCONTINUED | OUTPATIENT
Start: 2024-06-20 | End: 2024-06-20 | Stop reason: HOSPADM

## 2024-06-20 RX ORDER — PROCHLORPERAZINE EDISYLATE 5 MG/ML
5 INJECTION INTRAMUSCULAR; INTRAVENOUS EVERY 8 HOURS PRN
Status: DISCONTINUED | OUTPATIENT
Start: 2024-06-20 | End: 2024-06-20 | Stop reason: HOSPADM

## 2024-06-20 RX ORDER — DIPHENHYDRAMINE HYDROCHLORIDE 50 MG/ML
25 INJECTION INTRAMUSCULAR; INTRAVENOUS EVERY 4 HOURS PRN
Status: DISCONTINUED | OUTPATIENT
Start: 2024-06-20 | End: 2024-06-21

## 2024-06-20 RX ORDER — MORPHINE SULFATE 10 MG/ML
6 INJECTION, SOLUTION INTRAMUSCULAR; INTRAVENOUS EVERY 10 MIN PRN
Status: DISCONTINUED | OUTPATIENT
Start: 2024-06-20 | End: 2024-06-20 | Stop reason: HOSPADM

## 2024-06-20 RX ORDER — BUPIVACAINE HYDROCHLORIDE AND EPINEPHRINE 5; 5 MG/ML; UG/ML
INJECTION, SOLUTION PERINEURAL AS NEEDED
Status: DISCONTINUED | OUTPATIENT
Start: 2024-06-20 | End: 2024-06-20 | Stop reason: HOSPADM

## 2024-06-20 RX ORDER — DIAZEPAM 5 MG/1
5 TABLET ORAL EVERY 6 HOURS PRN
Status: DISCONTINUED | OUTPATIENT
Start: 2024-06-20 | End: 2024-06-21

## 2024-06-20 RX ORDER — MONTELUKAST SODIUM 10 MG/1
10 TABLET ORAL NIGHTLY
Status: DISCONTINUED | OUTPATIENT
Start: 2024-06-20 | End: 2024-06-21

## 2024-06-20 RX ORDER — LEVOTHYROXINE SODIUM 0.07 MG/1
75 TABLET ORAL
Status: DISCONTINUED | OUTPATIENT
Start: 2024-06-21 | End: 2024-06-21

## 2024-06-20 RX ORDER — ACETAMINOPHEN 500 MG
1000 TABLET ORAL ONCE
Status: COMPLETED | OUTPATIENT
Start: 2024-06-20 | End: 2024-06-20

## 2024-06-20 RX ORDER — PROCHLORPERAZINE EDISYLATE 5 MG/ML
5 INJECTION INTRAMUSCULAR; INTRAVENOUS EVERY 8 HOURS PRN
Status: DISCONTINUED | OUTPATIENT
Start: 2024-06-20 | End: 2024-06-21

## 2024-06-20 RX ORDER — POLYETHYLENE GLYCOL 3350 17 G/17G
17 POWDER, FOR SOLUTION ORAL DAILY PRN
Status: DISCONTINUED | OUTPATIENT
Start: 2024-06-20 | End: 2024-06-21

## 2024-06-20 RX ORDER — METOCLOPRAMIDE 10 MG/1
10 TABLET ORAL ONCE
Status: COMPLETED | OUTPATIENT
Start: 2024-06-20 | End: 2024-06-20

## 2024-06-20 RX ORDER — ROCURONIUM BROMIDE 10 MG/ML
INJECTION, SOLUTION INTRAVENOUS AS NEEDED
Status: DISCONTINUED | OUTPATIENT
Start: 2024-06-20 | End: 2024-06-20 | Stop reason: SURG

## 2024-06-20 RX ORDER — HYDROMORPHONE HYDROCHLORIDE 1 MG/ML
0.6 INJECTION, SOLUTION INTRAMUSCULAR; INTRAVENOUS; SUBCUTANEOUS EVERY 5 MIN PRN
Status: DISCONTINUED | OUTPATIENT
Start: 2024-06-20 | End: 2024-06-20 | Stop reason: HOSPADM

## 2024-06-20 RX ORDER — SPIRONOLACTONE 100 MG/1
100 TABLET, FILM COATED ORAL DAILY
Status: DISCONTINUED | OUTPATIENT
Start: 2024-06-21 | End: 2024-06-21

## 2024-06-20 RX ORDER — OXYCODONE HYDROCHLORIDE 5 MG/1
5 TABLET ORAL EVERY 4 HOURS PRN
Status: DISCONTINUED | OUTPATIENT
Start: 2024-06-20 | End: 2024-06-21

## 2024-06-20 RX ORDER — FAMOTIDINE 20 MG/1
20 TABLET, FILM COATED ORAL ONCE
Status: COMPLETED | OUTPATIENT
Start: 2024-06-20 | End: 2024-06-20

## 2024-06-20 RX ORDER — OXYCODONE HYDROCHLORIDE 5 MG/1
10 TABLET ORAL EVERY 4 HOURS PRN
Status: DISCONTINUED | OUTPATIENT
Start: 2024-06-20 | End: 2024-06-21

## 2024-06-20 RX ORDER — FAMOTIDINE 10 MG/ML
20 INJECTION, SOLUTION INTRAVENOUS ONCE
Status: COMPLETED | OUTPATIENT
Start: 2024-06-20 | End: 2024-06-20

## 2024-06-20 RX ORDER — ONDANSETRON 2 MG/ML
4 INJECTION INTRAMUSCULAR; INTRAVENOUS EVERY 6 HOURS PRN
Status: DISCONTINUED | OUTPATIENT
Start: 2024-06-20 | End: 2024-06-21

## 2024-06-20 RX ORDER — LIDOCAINE HYDROCHLORIDE 10 MG/ML
INJECTION, SOLUTION EPIDURAL; INFILTRATION; INTRACAUDAL; PERINEURAL AS NEEDED
Status: DISCONTINUED | OUTPATIENT
Start: 2024-06-20 | End: 2024-06-20 | Stop reason: SURG

## 2024-06-20 RX ORDER — SENNOSIDES 8.6 MG
17.2 TABLET ORAL NIGHTLY
Status: DISCONTINUED | OUTPATIENT
Start: 2024-06-20 | End: 2024-06-21

## 2024-06-20 RX ORDER — NALOXONE HYDROCHLORIDE 4 MG/.1ML
4 SPRAY NASAL AS NEEDED
Qty: 1 KIT | Refills: 0 | Status: SHIPPED | OUTPATIENT
Start: 2024-06-20

## 2024-06-20 RX ORDER — TIZANIDINE 2 MG/1
2 TABLET ORAL EVERY 8 HOURS PRN
Status: DISCONTINUED | OUTPATIENT
Start: 2024-06-20 | End: 2024-06-21

## 2024-06-20 RX ORDER — HYDROMORPHONE HYDROCHLORIDE 1 MG/ML
0.8 INJECTION, SOLUTION INTRAMUSCULAR; INTRAVENOUS; SUBCUTANEOUS EVERY 2 HOUR PRN
Status: DISCONTINUED | OUTPATIENT
Start: 2024-06-20 | End: 2024-06-21

## 2024-06-20 RX ORDER — HYDROMORPHONE HYDROCHLORIDE 1 MG/ML
0.4 INJECTION, SOLUTION INTRAMUSCULAR; INTRAVENOUS; SUBCUTANEOUS EVERY 2 HOUR PRN
Status: DISCONTINUED | OUTPATIENT
Start: 2024-06-20 | End: 2024-06-21

## 2024-06-20 RX ORDER — DOCUSATE SODIUM 100 MG/1
100 CAPSULE, LIQUID FILLED ORAL 2 TIMES DAILY
Status: DISCONTINUED | OUTPATIENT
Start: 2024-06-20 | End: 2024-06-21

## 2024-06-20 RX ORDER — NALOXONE HYDROCHLORIDE 0.4 MG/ML
80 INJECTION, SOLUTION INTRAMUSCULAR; INTRAVENOUS; SUBCUTANEOUS AS NEEDED
Status: DISCONTINUED | OUTPATIENT
Start: 2024-06-20 | End: 2024-06-20 | Stop reason: HOSPADM

## 2024-06-20 RX ORDER — PANTOPRAZOLE SODIUM 40 MG/1
40 TABLET, DELAYED RELEASE ORAL
Status: DISCONTINUED | OUTPATIENT
Start: 2024-06-21 | End: 2024-06-21

## 2024-06-20 RX ORDER — DIPHENHYDRAMINE HCL 25 MG
25 CAPSULE ORAL EVERY 4 HOURS PRN
Status: DISCONTINUED | OUTPATIENT
Start: 2024-06-20 | End: 2024-06-21

## 2024-06-20 RX ORDER — METOCLOPRAMIDE HYDROCHLORIDE 5 MG/ML
10 INJECTION INTRAMUSCULAR; INTRAVENOUS ONCE
Status: COMPLETED | OUTPATIENT
Start: 2024-06-20 | End: 2024-06-20

## 2024-06-20 RX ORDER — HYDROMORPHONE HYDROCHLORIDE 1 MG/ML
0.2 INJECTION, SOLUTION INTRAMUSCULAR; INTRAVENOUS; SUBCUTANEOUS EVERY 5 MIN PRN
Status: DISCONTINUED | OUTPATIENT
Start: 2024-06-20 | End: 2024-06-20 | Stop reason: HOSPADM

## 2024-06-20 RX ORDER — HYDROMORPHONE HYDROCHLORIDE 1 MG/ML
0.4 INJECTION, SOLUTION INTRAMUSCULAR; INTRAVENOUS; SUBCUTANEOUS EVERY 5 MIN PRN
Status: DISCONTINUED | OUTPATIENT
Start: 2024-06-20 | End: 2024-06-20 | Stop reason: HOSPADM

## 2024-06-20 RX ORDER — SODIUM CHLORIDE, SODIUM LACTATE, POTASSIUM CHLORIDE, CALCIUM CHLORIDE 600; 310; 30; 20 MG/100ML; MG/100ML; MG/100ML; MG/100ML
INJECTION, SOLUTION INTRAVENOUS CONTINUOUS
Status: DISCONTINUED | OUTPATIENT
Start: 2024-06-20 | End: 2024-06-21

## 2024-06-20 RX ORDER — ONDANSETRON 2 MG/ML
INJECTION INTRAMUSCULAR; INTRAVENOUS AS NEEDED
Status: DISCONTINUED | OUTPATIENT
Start: 2024-06-20 | End: 2024-06-20 | Stop reason: SURG

## 2024-06-20 RX ORDER — BISACODYL 10 MG
10 SUPPOSITORY, RECTAL RECTAL
Status: DISCONTINUED | OUTPATIENT
Start: 2024-06-20 | End: 2024-06-21

## 2024-06-20 RX ORDER — LIOTHYRONINE SODIUM 5 UG/1
5 TABLET ORAL DAILY
Status: DISCONTINUED | OUTPATIENT
Start: 2024-06-21 | End: 2024-06-21

## 2024-06-20 RX ORDER — OXYCODONE HYDROCHLORIDE 5 MG/1
5 TABLET ORAL EVERY 4 HOURS PRN
Status: SHIPPED | COMMUNITY
Start: 2024-06-20

## 2024-06-20 RX ORDER — MORPHINE SULFATE 4 MG/ML
4 INJECTION, SOLUTION INTRAMUSCULAR; INTRAVENOUS EVERY 10 MIN PRN
Status: DISCONTINUED | OUTPATIENT
Start: 2024-06-20 | End: 2024-06-20 | Stop reason: HOSPADM

## 2024-06-20 RX ORDER — DEXAMETHASONE SODIUM PHOSPHATE 4 MG/ML
VIAL (ML) INJECTION AS NEEDED
Status: DISCONTINUED | OUTPATIENT
Start: 2024-06-20 | End: 2024-06-20 | Stop reason: SURG

## 2024-06-20 RX ORDER — MORPHINE SULFATE 4 MG/ML
2 INJECTION, SOLUTION INTRAMUSCULAR; INTRAVENOUS EVERY 10 MIN PRN
Status: DISCONTINUED | OUTPATIENT
Start: 2024-06-20 | End: 2024-06-20 | Stop reason: HOSPADM

## 2024-06-20 RX ORDER — ENEMA 19; 7 G/133ML; G/133ML
1 ENEMA RECTAL ONCE AS NEEDED
Status: DISCONTINUED | OUTPATIENT
Start: 2024-06-20 | End: 2024-06-21

## 2024-06-20 RX ADMIN — DEXAMETHASONE SODIUM PHOSPHATE 4 MG: 4 MG/ML VIAL (ML) INJECTION at 10:42:00

## 2024-06-20 RX ADMIN — LIDOCAINE HYDROCHLORIDE 50 MG: 10 INJECTION, SOLUTION EPIDURAL; INFILTRATION; INTRACAUDAL; PERINEURAL at 10:42:00

## 2024-06-20 RX ADMIN — ONDANSETRON 4 MG: 2 INJECTION INTRAMUSCULAR; INTRAVENOUS at 10:42:00

## 2024-06-20 RX ADMIN — SODIUM CHLORIDE: 9 INJECTION, SOLUTION INTRAVENOUS at 10:55:00

## 2024-06-20 RX ADMIN — ROCURONIUM BROMIDE 50 MG: 10 INJECTION, SOLUTION INTRAVENOUS at 10:42:00

## 2024-06-20 NOTE — ANESTHESIA PROCEDURE NOTES
Peripheral IV  Date/Time: 6/20/2024 10:37 AM  Inserted by: Liban Fox CRNA    Placement  Needle size: 22 G  Laterality: left  Location: hand  Site prep: alcohol  Technique: anatomical landmarks  Attempts: 1

## 2024-06-20 NOTE — ANESTHESIA PREPROCEDURE EVALUATION
Anesthesia PreOp Note    HPI:     Debi Baldwin is a 58 year old female who presents for preoperative consultation requested by: Rik Velazquez MD    Date of Surgery: 6/20/2024    Procedure(s):  L5-S1 anterior lumbar interbody fusion with possible posterior spinal fusion, possible laminectomy  LUMBAR LAMINECTOMY 1 LEVEL  Indication: Lumbar radiculopathy, spinal stenosis lumbar region with neurogenic claudication    Relevant Problems   No relevant active problems       NPO:  Last Liquid Consumption Date: 06/19/24  Last Liquid Consumption Time: 2330  Last Solid Consumption Date: 06/19/24  Last Solid Consumption Time: 1900  Last Liquid Consumption Date: 06/19/24          History Review:  Patient Active Problem List    Diagnosis Date Noted    Lymphedema 06/11/2024    Trigeminal neuralgia 06/11/2024    History of revision of total knee arthroplasty 02/06/2023    Status post left hip replacement 02/06/2023    Arthritis of left hip 01/25/2023    Chronic left SI joint pain 05/11/2022    Chronic pain syndrome 05/11/2022    Fibromyalgia 05/11/2022    S/P gastric bypass 05/11/2022    History of tobacco abuse 05/11/2022    Aortic root dilatation (HCC) 05/11/2022    Coronary artery disease involving native coronary artery without angina pectoris 05/11/2022    Other specified hypothyroidism 05/11/2022    Hiatal hernia with gastroesophageal reflux disease and esophagitis 05/11/2022    Essential hypertension 05/11/2022       Past Medical History:    Anxiety    Aortic root dilatation (HCC)    Arthritis    Back problem    kyphosis    Cancer (HCC)    invasive ductal carcinoma- radiation therapy completed right    Coronary atherosclerosis    Depression    Disorder of thyroid    Environmental allergies    mold, dust, pollen    Essential hypertension    Exposure to medical diagnostic radiation    Hearing impairment    cochlear gina sclerosis    Heart attack (HCC)    Heart valve disease    High blood pressure    History of sleep apnea     has not used the CPAP since after gastric bypass surgery 2002 and 100# weight loss maintained    Hypoglycemia    Hypothyroidism    Lymphedema    involving L leg, R arm, and torso- wears compression sleeve and wraps    Mechanical and motor problems with neck and trunk    cervical spondylosis with myelopathy    Migraines    Musculoskeletal problem    cervical spine pain and occipital neuralgia. s/p nerve block on 10/21/19 with improvement and reports neck sensative to movements triggering pain    Occipital neuralgia    BRANDI (obstructive sleep apnea)    uses CPAP machine    Osteoarthritis    Osteopenia    Sacroiliac dysfunction    SI joint fusion    Sleep apnea    CPAP    Thoracic kyphosis    Thyroid nodule    stable    Tick bite    put on Doxy    Trigeminal neuralgia    Visual impairment    glasses       Past Surgical History:   Procedure Laterality Date    Adenoidectomy      Appendectomy  2017    Appendectomy      Biopsy of breast, needle core Right ,     Breast lumpectomy Right 2020    Breast surgery Bilateral 2020    breast reduction surgery    Bso, omentectomy w/brigid      with panniculectomy          x1    Cervical spine surgery      s/p C5-6, 6-7 ACDF and discectomy    Cholecystectomy      Colonoscopy  2020    repeat in 5 years    Excision of uvula      Uvulopalatopharygoplasty    Gastric bypass,obesity,sb reconstruc      Hip replacement surgery Left 2023    Dr. Unger    Hysterectomy      Inner ear surgery proc unlisted Right     for cochlear otosclerosis- titanium ranjit placed    Iridotomy/iridectomy by laser      Laparoscopy, gastric restrictive procedure, with gastric bypass for morbid  2002    Needle biopsy, lymph node(s)      pelvic lymph node swelling    Other      Discogram 21    Pancreas surgery proc unlisted      biopsy of cyst in pacreas, now 2 cysts- stable    Radiation therapy treatment, onc (ext)      Sinus surgery        sinus surgery procedure  1998, 1999    Spine surgery procedure unlisted      Tonsillectomy      Upper gi endoscopy,exam      Dr. Michelle done with EUS       Medications Prior to Admission   Medication Sig Dispense Refill Last Dose    HYDROcodone-acetaminophen  MG Oral Tab Take 1 tablet by mouth every 6 (six) hours as needed for Pain.   6/20/2024 at 500    Omeprazole 40 MG Oral Capsule Delayed Release Take 1 capsule (40 mg total) by mouth daily.   6/20/2024 at 500    docusate sodium 100 MG Oral Cap Take 1 capsule (100 mg total) by mouth 2 (two) times daily. (Patient taking differently: Take 1 capsule (100 mg total) by mouth daily as needed.) 60 capsule 2 6/19/2024 at 1800    amLODIPine 5 MG Oral Tab Take 1.5 tablets (7.5 mg total) by mouth daily.   6/20/2024 at 500    Omega Fatty Acids-Vitamins (OMEGA-3 GUMMIES) Oral Chew Tab Chew 3 each by mouth daily.   6/7/2024    CALCIUM-MAG-VIT C-VIT D OR Take 1 tablet by mouth in the morning, at noon, and at bedtime.   6/19/2024 at 1900    cetirizine 10 MG Oral Tab Take 1 tablet (10 mg total) by mouth at bedtime.   6/19/2024 at 2100    Cyanocobalamin-Methylcobalamin 600-600 MCG Sublingual SL Tab Place 1,000 mcg under the tongue 3 (three) times a week. Monday, Wednesday, Friday 6/13/2024    liothyronine 5 MCG Oral Tab Take 1 tablet (5 mcg total) by mouth daily.   6/20/2024 at 500    methocarbamol 750 MG Oral Tab Take 1 tablet (750 mg total) by mouth 2 (two) times daily as needed.   Past Week    montelukast 10 MG Oral Tab Take 1 tablet (10 mg total) by mouth nightly.   6/19/2024 at 2100    Multiple Vitamins-Minerals (MULTI-VITAMIN/MINERALS) Oral Tab Take 1 tablet by mouth daily.   6/19/2024 at 1000    Polyethylene Glycol 3350 (MIRALAX OR) Take 1 Dose by mouth 2 (two) times a day.   6/19/2024 at 1000    spironolactone 100 MG Oral Tab Take 1 tablet (100 mg total) by mouth daily.   6/19/2024 at 600    SYNTHROID 75 MCG Oral Tab Take 1 tablet (75 mcg total) by mouth before breakfast. BRAND NAME ONLY    2024 at 500    amoxicillin 500 MG Oral Cap Take 4 capsules by mouth, one hour before dental procedures   More than a month    Denosumab 60 MG/ML Subcutaneous Solution Prefilled Syringe Inject 1 mL (60 mg total) into the skin every 6 (six) months.   More than a month     Current Facility-Administered Medications Ordered in Epic   Medication Dose Route Frequency Provider Last Rate Last Admin    lactated ringers infusion   Intravenous Continuous Rik Velazquez MD 20 mL/hr at 24 0849 New Bag at 24 0849    ceFAZolin (Ancef) 2g in 10mL IV syringe premix  2 g Intravenous Once Rik Velazquez MD         No current [facility]-ordered outpatient medications on file.       Allergies   Allergen Reactions    Lisinopril HIVES and RASH    Other HIVES     Only when a steroid and a \"mycin\" drug taken together    Venlafaxine ANAPHYLAXIS, HIVES and OTHER (SEE COMMENTS)    Duloxetine FATIGUE, MYALGIA, NAUSEA ONLY, OTHER (SEE COMMENTS) and UNKNOWN    Nsaids OTHER (SEE COMMENTS)     Gastric bypass        Family History   Problem Relation Age of Onset    Heart Disorder Father     Cancer Mother         breast cancer     Social History     Socioeconomic History    Marital status:    Tobacco Use    Smoking status: Former     Current packs/day: 0.00     Types: Cigarettes     Start date:      Quit date: 2017     Years since quittin.4     Passive exposure: Past    Smokeless tobacco: Never   Vaping Use    Vaping status: Never Used   Substance and Sexual Activity    Alcohol use: Not Currently    Drug use: Yes     Types: Cannabis     Comment: not currently, for trigeminal neuralgia, gummie       Available pre-op labs reviewed.  Lab Results   Component Value Date    WBC 6.2 2024    RBC 4.43 2024    HGB 14.2 2024    HCT 41.7 2024    MCV 94.1 2024    MCH 32.1 2024    MCHC 34.1 2024    RDW 12.2 2024    .0 2024     Lab Results   Component Value Date      06/04/2024    K 4.0 06/04/2024     06/04/2024    CO2 26.0 06/04/2024    BUN 11 06/04/2024    CREATSERUM 0.79 06/04/2024    GLU 91 06/04/2024    CA 9.6 06/04/2024     Lab Results   Component Value Date    INR 0.92 06/04/2024       Vital Signs:  Body mass index is 32.58 kg/m².   height is 1.702 m (5' 7\") and weight is 94.3 kg (208 lb). Her oral temperature is 97.7 °F (36.5 °C). Her blood pressure is 134/95 (abnormal) and her pulse is 71. Her respiration is 16 and oxygen saturation is 100%.   Vitals:    06/11/24 1335 06/20/24 0814   BP:  (!) 134/95   Pulse:  71   Resp:  16   Temp:  97.7 °F (36.5 °C)   TempSrc:  Oral   SpO2:  100%   Weight: 89.8 kg (198 lb) 94.3 kg (208 lb)   Height: 1.702 m (5' 7\")         Anesthesia Evaluation     Patient summary reviewed and Nursing notes reviewed    No history of anesthetic complications   Airway   Mallampati: III  TM distance: >3 FB  Neck ROM: full  Dental      Pulmonary     breath sounds clear to auscultation  (+) sleep apnea on CPAP  (-) COPD, asthma  Cardiovascular   (+) hypertension, CAD  (-) CHF    Rhythm: regular  Rate: normal    Neuro/Psych    (+)  anxiety/panic attacks,  depression    (-) CVA    GI/Hepatic/Renal    (+) hiatal hernia, GERD  (-) liver disease, renal disease    Endo/Other    (+) hypothyroidism  (-) diabetes mellitus  Abdominal   (+) obese                 Anesthesia Plan:   ASA:  3  Plan:   General  Monitors and Lines:   Additonal IV  Airway:  ETT  Post-op Pain Management: IV analgesics and Oral pain medication  Informed Consent Plan and Risks Discussed With:  Patient  Discussed plan with:  CRNA      I have informed Debi Baldwin and/or legal guardian or family member of the nature of the anesthetic plan, benefits, risks including possible dental damage if relevant, major complications, and any alternative forms of anesthetic management.   All of the patient's questions were answered to the best of my ability. The patient desires the anesthetic management  as planned.  Juan Acuna MD  6/20/2024 9:11 AM  Present on Admission:   Chronic pain syndrome   S/P gastric bypass   Aortic root dilatation (HCC)   Coronary artery disease involving native coronary artery without angina pectoris   Hiatal hernia with gastroesophageal reflux disease and esophagitis   Essential hypertension   Lymphedema   Trigeminal neuralgia

## 2024-06-20 NOTE — ANESTHESIA PROCEDURE NOTES
Airway  Date/Time: 6/20/2024 10:48 AM  Urgency: elective    Airway not difficult    General Information and Staff    Patient location during procedure: OR  Anesthesiologist: Juan Acuna MD  Resident/CRNA: Liban Fox CRNA  Performed: CRNA   Performed by: Liban Fox CRNA  Authorized by: Juan Acuna MD      Indications and Patient Condition  Indications for airway management: anesthesia and airway protection  Spontaneous ventilation: present  Sedation level: deep  Preoxygenated: yes  Patient position: sniffing  MILS maintained throughout  Mask difficulty assessment: 1 - vent by mask    Final Airway Details  Final airway type: endotracheal airway      Successful airway: ETT  Cuffed: yes   Successful intubation technique: direct laryngoscopy  Facilitating devices/methods: intubating stylet  Blade: James  Blade size: #4  ETT size (mm): 7.0    Cormack-Lehane Classification: grade I - full view of glottis  Placement verified by: capnometry   Measured from: gums  ETT to gums (cm): 21    Additional Comments  Atraumatic / bite block in place

## 2024-06-20 NOTE — H&P
[facility]  part of [facility]    History & Physical    Debi Baldwin Patient Status:  Outpatient in a Bed    1965 MRN [id number]   Location [facility] PRE OP RECOVERY Attending Rik Velazquez MD   Hosp Day # 0 PCP Yudy Michelle MD     Date:  2024  Date of Admission:  2024      History:  Past Medical History:    Anxiety    Aortic root dilatation (HCC)    Arthritis    Back problem    kyphosis    Cancer (HCC)    invasive ductal carcinoma- radiation therapy completed right    Coronary atherosclerosis    Depression    Disorder of thyroid    Environmental allergies    mold, dust, pollen    Essential hypertension    Exposure to medical diagnostic radiation    Hearing impairment    cochlear gina sclerosis    Heart attack (HCC)    Heart valve disease    High blood pressure    History of sleep apnea    has not used the CPAP since after gastric bypass surgery 2002 and 100# weight loss maintained    Hypoglycemia    Hypothyroidism    Lymphedema    involving L leg, R arm, and torso- wears compression sleeve and wraps    Mechanical and motor problems with neck and trunk    cervical spondylosis with myelopathy    Migraines    Musculoskeletal problem    cervical spine pain and occipital neuralgia. s/p nerve block on 10/21/19 with improvement and reports neck sensative to movements triggering pain    Occipital neuralgia    BRANDI (obstructive sleep apnea)    uses CPAP machine    Osteoarthritis    Osteopenia    Sacroiliac dysfunction    SI joint fusion    Sleep apnea    CPAP    Thoracic kyphosis    Thyroid nodule    stable    Tick bite    put on Doxy    Trigeminal neuralgia    Visual impairment    glasses     Past Surgical History:   Procedure Laterality Date    Adenoidectomy      Appendectomy  2017    Appendectomy      Biopsy of breast, needle core Right ,     Breast lumpectomy Right 2020    Breast surgery Bilateral 2020    breast reduction surgery    Bso, omentectomy w/brigid       with panniculectomy          x1    Cervical spine surgery      s/p C5-6, 6-7 ACDF and discectomy    Cholecystectomy      Colonoscopy  ,     repeat in 5 years    Excision of uvula      Uvulopalatopharygoplasty    Gastric bypass,obesity,sb reconstruc      Hip replacement surgery Left 2023    Dr. Unger    Hysterectomy      Inner ear surgery proc unlisted Right     for cochlear otosclerosis- titanium ranjit placed    Iridotomy/iridectomy by laser      Laparoscopy, gastric restrictive procedure, with gastric bypass for morbid  2002    Needle biopsy, lymph node(s)      pelvic lymph node swelling    Other      Discogram 21    Pancreas surgery proc unlisted      biopsy of cyst in pacreas, now 2 cysts- stable    Radiation therapy treatment, onc (ext)      Sinus surgery        sinus surgery procedure ,     Spine surgery procedure unlisted      Tonsillectomy      Upper gi endoscopy,exam      Dr. Michelle done with EUS     Family History   Problem Relation Age of Onset    Heart Disorder Father     Cancer Mother         breast cancer      reports that she quit smoking about 7 years ago. Her smoking use included cigarettes. She started smoking about 42 years ago. She has been exposed to tobacco smoke. She has never used smokeless tobacco. She reports that she does not currently use alcohol. She reports current drug use. Drug: Cannabis.    Allergies:  Allergies   Allergen Reactions    Lisinopril HIVES and RASH    Other HIVES     Only when a steroid and a \"mycin\" drug taken together    Venlafaxine ANAPHYLAXIS, HIVES and OTHER (SEE COMMENTS)    Duloxetine FATIGUE, MYALGIA, NAUSEA ONLY, OTHER (SEE COMMENTS) and UNKNOWN    Nsaids OTHER (SEE COMMENTS)     Gastric bypass        Home Medications:  Medications Prior to Admission   Medication Sig Dispense Refill Last Dose    HYDROcodone-acetaminophen  MG Oral Tab Take 1 tablet by mouth every 6 (six) hours as needed for Pain.   2024  at 500    Omeprazole 40 MG Oral Capsule Delayed Release Take 1 capsule (40 mg total) by mouth daily.   6/20/2024 at 500    docusate sodium 100 MG Oral Cap Take 1 capsule (100 mg total) by mouth 2 (two) times daily. (Patient taking differently: Take 1 capsule (100 mg total) by mouth daily as needed.) 60 capsule 2 6/19/2024 at 1800    amLODIPine 5 MG Oral Tab Take 1.5 tablets (7.5 mg total) by mouth daily.   6/20/2024 at 500    Omega Fatty Acids-Vitamins (OMEGA-3 GUMMIES) Oral Chew Tab Chew 3 each by mouth daily.   6/7/2024    CALCIUM-MAG-VIT C-VIT D OR Take 1 tablet by mouth in the morning, at noon, and at bedtime.   6/19/2024 at 1900    cetirizine 10 MG Oral Tab Take 1 tablet (10 mg total) by mouth at bedtime.   6/19/2024 at 2100    Cyanocobalamin-Methylcobalamin 600-600 MCG Sublingual SL Tab Place 1,000 mcg under the tongue 3 (three) times a week. Monday, Wednesday, Friday 6/13/2024    liothyronine 5 MCG Oral Tab Take 1 tablet (5 mcg total) by mouth daily.   6/20/2024 at 500    methocarbamol 750 MG Oral Tab Take 1 tablet (750 mg total) by mouth 2 (two) times daily as needed.   Past Week    montelukast 10 MG Oral Tab Take 1 tablet (10 mg total) by mouth nightly.   6/19/2024 at 2100    Multiple Vitamins-Minerals (MULTI-VITAMIN/MINERALS) Oral Tab Take 1 tablet by mouth daily.   6/19/2024 at 1000    Polyethylene Glycol 3350 (MIRALAX OR) Take 1 Dose by mouth 2 (two) times a day.   6/19/2024 at 1000    spironolactone 100 MG Oral Tab Take 1 tablet (100 mg total) by mouth daily.   6/19/2024 at 600    SYNTHROID 75 MCG Oral Tab Take 1 tablet (75 mcg total) by mouth before breakfast. BRAND NAME ONLY   6/20/2024 at 500    amoxicillin 500 MG Oral Cap Take 4 capsules by mouth, one hour before dental procedures   More than a month    Denosumab 60 MG/ML Subcutaneous Solution Prefilled Syringe Inject 1 mL (60 mg total) into the skin every 6 (six) months.   More than a month       Review of Systems:  12 point ROS reviewed without  pertinent positives.     HPI: The patient presents with complaints of low back pain and radiculopathy. The pain radiates from the low back to the left buttock, lateral hip and thigh, posterior thigh and down the leg to the foot.  She has numbness and tingling to the left lower leg with weakness.  They deny prior lumbar spine surgery but she has had multiple abdominal surgeries including a hysterectomy with abdominoplasty across the left lower abdominal.  They deny current fevers, chills, infection, rashes/bruises on the body, gross bowel/bladder incontinence or saddle anesthesia.     Physical Exam:  The patient is well developed, no acute distress, alert and oriented x3, skin intact to the posterior lumbar without erythema or edema, motor exam with 4-/5 EHL/tib ant on the left and 4/5 left quad, otherwise 5/5 bilateral lower extremities, calves soft and non tender, 2+DP    Assessment:  Patient Active Problem List   Diagnosis    Chronic left SI joint pain    Chronic pain syndrome    Fibromyalgia    S/P gastric bypass    History of tobacco abuse    Aortic root dilatation (HCC)    Coronary artery disease involving native coronary artery without angina pectoris    Other specified hypothyroidism    Hiatal hernia with gastroesophageal reflux disease and esophagitis    Essential hypertension    Arthritis of left hip    History of revision of total knee arthroplasty    Status post left hip replacement    Lymphedema    Trigeminal neuralgia     Lumbar stenosis, radiculopathy    Plan:  L5-S1 ALIF with PSF, possible laminectomy     Skylar Coronel PA-C  6/20/2024  9:20 AM

## 2024-06-20 NOTE — ANESTHESIA PROCEDURE NOTES
Peripheral IV  Date/Time: 6/20/2024 10:55 AM  Inserted by: Liban Fox CRNA    Placement  Needle size: 20 G  Laterality: right  Location: foot  Site prep: alcohol  Technique: anatomical landmarks  Attempts: 1

## 2024-06-20 NOTE — PHYSICAL THERAPY NOTE
PHYSICAL THERAPY EVALUATION - INPATIENT     Room Number: Room 4/Room 4-A  Evaluation Date: 6/20/2024  Type of Evaluation: Initial   Physician Order: PT Eval and Treat    Presenting Problem: s/p L5-S1 ALIF 6/20/24     Reason for Therapy: Mobility Dysfunction and Discharge Planning    PHYSICAL THERAPY ASSESSMENT   Patient is a 58 year old female admitted 6/20/2024 for L5-S1 ALIF.  Prior to admission, patient's baseline is independent, not using assistive device pta.  Patient is currently functioning near baseline with bed mobility, transfers, and gait.  Patient is requiring contact guard assist as a result of the following impairments: decreased functional strength, decreased endurance/aerobic capacity, and pain.  Physical Therapy will continue to follow for duration of hospitalization.    Patient will benefit from continued skilled PT Services For duration of hospitalization, however, given the patient is functioning near baseline level do not anticipate skilled therapy needs at discharge .    PLAN  PT Treatment Plan: Endurance;Energy conservation;Patient education;Gait training;Transfer training  Rehab Potential : Good  Frequency (Obs): Daily    PHYSICAL THERAPY MEDICAL/SOCIAL HISTORY        Problem List  Principal Problem:    Spinal stenosis of lumbar region with neurogenic claudication  Active Problems:    Chronic pain syndrome    S/P gastric bypass    Aortic root dilatation (HCC)    Coronary artery disease involving native coronary artery without angina pectoris    Hiatal hernia with gastroesophageal reflux disease and esophagitis    Essential hypertension    Lymphedema    Trigeminal neuralgia    S/P lumbar spinal fusion      HOME SITUATION  Home Situation  Type of Home: House  Home Layout: One level  Stairs to Enter : 2  Lives With: Spouse  Drives: Yes  Patient Regularly Uses: Glasses     Prior Level of [place]: Pt reports ind pta, did not use assistive device.  Pt lives with supportive spouse who will be  able to assist as needed upon edw dc.        SUBJECTIVE  \"My leg numbness feels better\"    PHYSICAL THERAPY EXAMINATION   OBJECTIVE  Precautions: Spine  Fall Risk: Standard fall risk    WEIGHT BEARING RESTRICTION  Weight Bearing Restriction: None                PAIN ASSESSMENT  Ratin  Location: surgical  Management Techniques: Activity promotion    COGNITION  Overall Cognitive Status:  WFL - within functional limits    RANGE OF MOTION AND STRENGTH ASSESSMENT  Upper extremity ROM and strength are within functional limits   Lower extremity ROM is within functional limits   Lower extremity strength is within functional limits     BALANCE  Static Sitting: Good  Dynamic Sitting: Good  Static Standing: Fair -  Dynamic Standing: Fair -      AM-PAC '6-Clicks' INPATIENT SHORT FORM - BASIC MOBILITY  How much difficulty does the patient currently have...  Patient Difficulty: Turning over in bed (including adjusting bedclothes, sheets and blankets)?: A Little   Patient Difficulty: Sitting down on and standing up from a chair with arms (e.g., wheelchair, bedside commode, etc.): A Little   Patient Difficulty: Moving from lying on back to sitting on the side of the bed?: A Little   How much help from another person does the patient currently need...   Help from Another: Moving to and from a bed to a chair (including a wheelchair)?: A Little   Help from Another: Need to walk in hospital room?: A Little   Help from Another: Climbing 3-5 steps with a railing?: A Little     AM-PAC Score:  Raw Score: 18   Approx Degree of Impairment: 46.58%   Standardized Score (AM-PAC Scale): 43.63   CMS Modifier (G-Code): CK    FUNCTIONAL ABILITY STATUS  Functional Mobility/Gait Assessment  Gait Assistance: Contact guard assist  Distance (ft): 100  Assistive Device: Rolling walker  Pattern: Shuffle  Rolling: supervision  Supine to Sit: supervision  Sit to Stand: contact guard assist    Exercise/Education Provided:  Bed mobility  Body  mechanics  Energy conservation  Functional activity tolerated  Gait training  Transfer training     Pt ok to see per rn.  Pt recd in supine, spouse present, educated in role of PT, goals for session, spine precautions, log roll technique, ankle pumps for DVT prevention.  Pt demonstrating all fxal mobility including bed mobility, transfers, ambulation on levels with cga/supervision assist, able to adhere to spine precautions with all fxal mobility.  Pt returned to bedside chair,  instructed to cont to amb 4x/day with staff.  Pt educated in chair choice for home, activity recommendations, frequent position changes, pt expressed good understanding of all instructions.  All patient questions answered.     The patient's Approx Degree of Impairment: 46.58% has been calculated based on documentation in the [facility] '6 clicks' Inpatient Basic Mobility Short Form.  Research supports that patients with this level of impairment may benefit from home.  Final disposition will be made by interdisciplinary medical team.    Patient End of Session: Up in chair;Needs met;Call light within reach;RN aware of session/findings;All patient questions and concerns addressed;Family present    CURRENT GOALS  Goals to be met by: 6/21/24  Patient Goal Patient's self-stated goal is: to go home   Goal #1 Patient is able to demonstrate supine - sit EOB @ level: modified independent     Goal #1   Current Status    Goal #2 Patient is able to demonstrate transfers Sit to/from Stand at assistance level: modified independent with none     Goal #2  Current Status    Goal #3 Patient is able to ambulate 150 feet with assist device: none at assistance level: modified independent   Goal #3   Current Status    Goal #4 Patient will negotiate 2 stairs/one curb w/ assistive device and supervision   Goal #4   Current Status    Goal #5 Patient to demonstrate independence with home activity/exercise instructions provided to patient in preparation for discharge.    Goal #5   Current Status    Goal #6    Goal #6  Current Status      Patient Evaluation Complexity Level:  History Moderate - 1 or 2 personal factors and/or co-morbidities   Examination of body systems Low -  addressing 1-2 elements   Clinical Presentation Low- Stable   Clinical Decision Making  Low Complexity     Gait Training: 15 minutes  Therapeutic Activity:  13 minutes

## 2024-06-20 NOTE — ANESTHESIA POSTPROCEDURE EVALUATION
Patient: Debi Baldwin    Procedure Summary       Date: 06/20/24 Room / Location: [facility] MAIN OR  / [facility] MAIN OR    Anesthesia Start: 1036 Anesthesia Stop: 1322    Procedure: L5-S1 anterior lumbar interbody fusion with posterior spinal fusion (Spine Lumbar) Diagnosis: (Lumbar radiculopathy, spinal stenosis lumbar region with neurogenic claudication)    Surgeons: Rik Velazquez MD Anesthesiologist: Juan Acuna MD    Anesthesia Type: general ASA Status: 3            Anesthesia Type: general    Vitals Value Taken Time   /88 06/20/24 1421   Temp 98 °F (36.7 °C) 06/20/24 1319   Pulse 84 06/20/24 1428   Resp 24 06/20/24 1428   SpO2 90 % 06/20/24 1428   Vitals shown include unfiled device data.    [facility] AN Post Evaluation:   Patient Evaluated in PACU  Patient Participation: complete - patient participated  Level of Consciousness: sleepy but conscious  Pain Score: 0  Pain Management: adequate  Airway Patency:patent  Dental exam unchanged from preop  Yes    Cardiovascular Status: acceptable  Respiratory Status: acceptable  Postoperative Hydration acceptable      Liban Fox CRNA  6/20/2024 2:28 PM

## 2024-06-21 ENCOUNTER — APPOINTMENT (OUTPATIENT)
Dept: GENERAL RADIOLOGY | Facility: HOSPITAL | Age: 59
DRG: 455 | End: 2024-06-21
Attending: PHYSICIAN ASSISTANT
Payer: MEDICARE

## 2024-06-21 VITALS
RESPIRATION RATE: 16 BRPM | SYSTOLIC BLOOD PRESSURE: 131 MMHG | DIASTOLIC BLOOD PRESSURE: 89 MMHG | WEIGHT: 208 LBS | HEIGHT: 67 IN | HEART RATE: 84 BPM | OXYGEN SATURATION: 100 % | TEMPERATURE: 98 F | BODY MASS INDEX: 32.65 KG/M2

## 2024-06-21 LAB
DEPRECATED RDW RBC AUTO: 41.3 FL (ref 35.1–46.3)
ERYTHROCYTE [DISTWIDTH] IN BLOOD BY AUTOMATED COUNT: 11.9 % (ref 11–15)
HCT VFR BLD AUTO: 35.6 %
HGB BLD-MCNC: 12.4 G/DL
MCH RBC QN AUTO: 32.7 PG (ref 26–34)
MCHC RBC AUTO-ENTMCNC: 34.8 G/DL (ref 31–37)
MCV RBC AUTO: 93.9 FL
PLATELET # BLD AUTO: 218 10(3)UL (ref 150–450)
RBC # BLD AUTO: 3.79 X10(6)UL
WBC # BLD AUTO: 11.4 X10(3) UL (ref 4–11)

## 2024-06-21 PROCEDURE — 72100 X-RAY EXAM L-S SPINE 2/3 VWS: CPT | Performed by: PHYSICIAN ASSISTANT

## 2024-06-21 NOTE — PROGRESS NOTES
[facility]  part of [facility]    Progress Note    Debi Baldwin Patient Status:  Outpatient in a Bed    1965 MRN [id number]   Location [facility] Attending Lina Holt MD   Hosp Day # 0 PCP Yudy Michelle MD       Subjective:   Debi Baldwin is a(n) 58 year old female is doing very well after her lumbar procedure yesterday.    Objective:   Vital Signs:  Blood pressure 131/89, pulse 84, temperature 98.3 °F (36.8 °C), temperature source Oral, resp. rate 16, height 5' 7\" (1.702 m), weight 208 lb (94.3 kg), SpO2 100%.     General: No acute distress. Alert and oriented x 3.  HEENT: Moist mucous membranes.   Neck: No lymphadenopathy.  No JVD. No carotid bruits.  Respiratory: Clear to auscultation bilaterally.  No wheezes. No rhonchi.  Cardiovascular: S1, S2.  Regular rate and rhythm.  No murmurs. Equal pulses   Abdomen: Soft, nontender, nondistended.  Positive bowel sounds. No rebound tenderness  Neurologic: No focal neurological deficits.  Musculoskeletal: Full range of motion of all extremities.  No swelling noted.  Integument: No lesions. No erythema.  Psychiatric: Appropriate mood and affect.      Assessment and Plan:     Spinal stenosis of lumbar region with neurogenic claudication  POD#1 doing well      Chronic pain syndrome        S/P gastric bypass        Aortic root dilatation (HCC)        Coronary artery disease involving native coronary artery without angina pectoris        Hiatal hernia with gastroesophageal reflux disease and esophagitis        Essential hypertension        Lymphedema        Trigeminal neuralgia        S/P lumbar spinal fusion    Plan for home today        Results:     Lab Results   Component Value Date    WBC 11.4 (H) 2024    HGB 12.4 2024    HCT 35.6 2024    .0 2024    CREATSERUM 0.79 2024    BUN 11 2024     2024    K 4.0 2024     2024    CO2 26.0 2024    GLU 91 2024     CA 9.6 06/04/2024    ALB 4.8 06/04/2024    ALKPHO 67 06/04/2024    BILT 0.6 06/04/2024    TP 7.5 06/04/2024    AST 39 (H) 06/04/2024    ALT 35 06/04/2024    PTT 25.3 06/04/2024    INR 0.92 06/04/2024       XR FLUOROSCOPY C-ARM TIME LESS THAN 1 HOUR (CPT=76000)    Result Date: 6/20/2024  CONCLUSION: Intraoperative fluoroscopy provided.  Please see surgical note for specific details.     Dictated by (CST): Eligio Rivera MD on 6/20/2024 at 1:57 PM     Finalized by (CST): Eligio Rivera MD on 6/20/2024 at 1:57 PM                       Liban Collado DO  6/21/2024

## 2024-06-21 NOTE — PHYSICAL THERAPY NOTE
PHYSICAL THERAPY TREATMENT NOTE - INPATIENT     Room Number: Room 4/Room 4-A       Presenting Problem: s/p L5-S1 ALIF 24       Problem List  Principal Problem:    Spinal stenosis of lumbar region with neurogenic claudication  Active Problems:    Chronic pain syndrome    S/P gastric bypass    Aortic root dilatation (HCC)    Coronary artery disease involving native coronary artery without angina pectoris    Hiatal hernia with gastroesophageal reflux disease and esophagitis    Essential hypertension    Lymphedema    Trigeminal neuralgia    S/P lumbar spinal fusion      PHYSICAL THERAPY ASSESSMENT   Patient demonstrates good  progress this session, goals  remain in progress.    Patient continues to function below baseline with bed mobility, transfers, and gait.  Contributing factors to remaining limitations include decreased functional strength, decreased endurance/aerobic capacity, and pain.  Next session anticipate patient to progress bed mobility, transfers, and gait.  Physical Therapy will continue to follow patient for duration of hospitalization.    Patient continues to benefit from continued skilled PT services: at discharge to promote functional independence in home.  Anticipate patient will return home with home health PT.    PLAN  PT Treatment Plan: Bed mobility;Endurance;Gait training;Stair training  Frequency (Obs): Daily    SUBJECTIVE  Pt reports being ready for PT RX    OBJECTIVE  Precautions: Spine    WEIGHT BEARING RESTRICTION                PAIN ASSESSMENT   Ratin  Location: surgical  Management Techniques: Activity promotion    BALANCE  Static Sitting: Good  Dynamic Sitting: Good  Static Standing: Fair -  Dynamic Standing: Fair -    ACTIVITY TOLERANCE                          O2 WALK       AM-PAC '6-Clicks' INPATIENT SHORT FORM - BASIC MOBILITY  How much difficulty does the patient currently have...  Patient Difficulty: Turning over in bed (including adjusting bedclothes, sheets and  blankets)?: A Little   Patient Difficulty: Sitting down on and standing up from a chair with arms (e.g., wheelchair, bedside commode, etc.): A Little   Patient Difficulty: Moving from lying on back to sitting on the side of the bed?: A Little   How much help from another person does the patient currently need...   Help from Another: Moving to and from a bed to a chair (including a wheelchair)?: A Little   Help from Another: Need to walk in hospital room?: A Little   Help from Another: Climbing 3-5 steps with a railing?: A Little     AM-PAC Score:  Raw Score: 18   Approx Degree of Impairment: 46.58%   Standardized Score (AM-PAC Scale): 43.63   CMS Modifier (G-Code): CK    FUNCTIONAL ABILITY STATUS  Functional Mobility/Gait Assessment  Gait Assistance: Contact guard assist  Distance (ft): 2 x 100  Assistive Device: Rolling walker  Pattern: Shuffle  Stairs: Stairs  How Many Stairs: 12  Device: 2 Rails  Assist: Contact guard assist  Pattern: Ascend and Descend  Rolling: minimal assist  Supine to Sit: minimal assist  Sit to Supine: minimal assist  Sit to Stand: minimal assist    Additional information: Pt seen daily.Min a for bed mobility and transfer;extra time provided to complete task.EOB sitting balance activity with emphasis on core stabilization.Pt amb 2 x 100 ft with RW and CGa;provided cuing for gait pattern as well as for postural awareness as well a sfor postural awareness.Navigated 12 stairs with CGA.Spinal precautions reviewed;education.Family present;family education;all questions and concerns addressed.    The patient's Approx Degree of Impairment: 46.58% has been calculated based on documentation in the [facility] '6 clicks' Inpatient Daily Activity Short Form.  Research supports that patients with this level of impairment may benefit from Home.  Final disposition will be made by interdisciplinary medical team.    THERAPEUTIC EXERCISES  Lower Extremity Ankle pumps  Glut sets  Hip AB/AD  Heel slides  Quad sets      Position Supine       Patient End of Session: Up in chair;Call light within reach;RN aware of session/findings;All patient questions and concerns addressed    CURRENT GOALS     Patient Goal Patient's self-stated goal is: to go home   Goal #1 Patient is able to demonstrate supine - sit EOB @ level: modified independent     Goal #1   Current Status Min a   Goal #2 Patient is able to demonstrate transfers Sit to/from Stand at assistance level: modified independent with none     Goal #2  Current Status Min a   Goal #3 Patient is able to ambulate 150 feet with assist device: none at assistance level: modified independent   Goal #3   Current Status Pt amb 2 x 100 ft with RW and CGA   Goal #4 Patient will negotiate 2 stairs/one curb w/ assistive device and supervision   Goal #4   Current Status Navigated 12 stairs with CGA   Goal #5 Patient to demonstrate independence with home activity/exercise instructions provided to patient in preparation for discharge.   Goal #5   Current Status In progress   Goal #6    Goal #6  Current Status      Gait Training: 15 minutes  Therapeutic Activity: 15 minutes  Neuromuscular Re-education:  minutes  Therapeutic Exercise: 15 minuts  Canalith Repositioning:  minutes  Manual Therapy:  minutes  Can add/delete any of these

## 2024-06-21 NOTE — OCCUPATIONAL THERAPY NOTE
OCCUPATIONAL THERAPY EVALUATION - INPATIENT     Room Number: Room 4/Room 4-A  Evaluation Date: 6/21/2024  Type of Evaluation: Initial  Presenting Problem: L5-S1 ALIF    Physician Order: IP Consult to Occupational Therapy  Reason for Therapy: ADL/IADL Dysfunction and Discharge Planning    OCCUPATIONAL THERAPY ASSESSMENT   Patient is a 58 year old female admitted 6/20/2024 for SS s/p L5S1 ALIF.  Prior to admission, patient's baseline is independent. Pt was I without AD and driving.  Pt lives with her supportive spouse and 2 dogs.  Patient is currently functioning near baseline.   Pt and spouse deny any further questions or discharge concerns. Pt cites a h/o multiple surgeries in the recent path and feels she has the support as well as the DME/AE in place at home.     PLAN  Patient has been evaluated and presents with no skilled Occupational Therapy needs  at this time.  Patient will be discharged from Occupational Therapy services. Please re-order if a new functional limitation presents during this admission.     OCCUPATIONAL THERAPY MEDICAL/SOCIAL HISTORY   Problem List  Principal Problem:    Spinal stenosis of lumbar region with neurogenic claudication  Active Problems:    Chronic pain syndrome    S/P gastric bypass    Aortic root dilatation (HCC)    Coronary artery disease involving native coronary artery without angina pectoris    Hiatal hernia with gastroesophageal reflux disease and esophagitis    Essential hypertension    Lymphedema    Trigeminal neuralgia    S/P lumbar spinal fusion    HOME SITUATION  Type of Home: House  Home Layout: One level  Lives With: Spouse  Toilet and Equipment: Comfort height toilet  Shower/Tub and Equipment: Shower chair; Tub-shower combo  Occupation/Status: disability  Drives: Yes  Patient Regularly Uses: Glasses    Stairs in Home: 2  Use of Assistive Device(s): none    Prior Level of [place]: Prior to admission, patient's baseline is independent. Pt was I without AD and  driving.  Pt lives with her supportive spouse and 2 dogs.    SUBJECTIVE  \"I have had 4 ortho surgeries in 4 years\"    OCCUPATIONAL THERAPY EXAMINATION    OBJECTIVE  Precautions: Spine  Fall Risk: Standard fall risk      PAIN ASSESSMENT  Ratin  Location: ant/posterior low back pain  Management Techniques: Ice (pain meds)      RANGE OF MOTION   Upper extremity ROM is within functional limits     STRENGTH ASSESSMENT  Upper extremity strength is within functional limits     ACTIVITIES OF DAILY LIVING ASSESSMENT  AM-PAC ‘6-Clicks’ Inpatient Daily Activity Short Form  How much help from another person does the patient currently need…  -   Putting on and taking off regular lower body clothing?: None  -   Bathing (including washing, rinsing, drying)?: A Little  -   Toileting, which includes using toilet, bedpan or urinal? : None  -   Putting on and taking off regular upper body clothing?: None  -   Taking care of personal grooming such as brushing teeth?: None  -   Eating meals?: None    AM-PAC Score:  Score: 23  Approx Degree of Impairment: 15.86%  Standardized Score (AM-PAC Scale): 51.12  CMS Modifier (G-Code): CI    FUNCTIONAL TRANSFER ASSESSMENT  Sit to Stand: Chair  Chair: Independent  Toilet Transfer: Supervision  Tub Transfer: Supervision  Car Transfer: Supervision    BED MOBILITY  Rolling: Not Tested  Supine to Sit : Not tested  Sit to Supine (OT): Not Tested  Pt and spouse verbalize understanding of instructions for log roll with ab sparing.       FUNCTIONAL ADL ASSESSMENT  Feeding: I  Grooming: Mod I supported with R/W in standing  LE Dressing: Pt demo the abiltyi to comfortably perform PETE crossleg technique and manges socks, shoes, underwear, and pants with I while maintaining spine precautions      Skilled Therapy Provided: Pt received sitting up in chair with supportive spouse present. Pt was able to return demo of requested bed side ADLs while maintaining precautions as well as in room functional mobility  and xfers (chair, toilet, simulated car and tub) using R/W. Pt and spouse acknowlege ab sparing techniques.     EDUCATION PROVIDED  Patient: Role of Occupational Therapy; Plan of Care; Discharge Recommendations; DME Recommendations; Functional Transfer Techniques; Surgical Precautions; Posture/Positioning; Compensatory ADL Techniques; Proper Body Mechanics; Abdominal Protective Strategies (ab sparing)  Patient's Response to Education: Verbalized Understanding; Returned Demonstration  Family/Caregiver's Response to Education: Verbalized Understanding    The patient's Approx Degree of Impairment: 15.86% has been calculated based on documentation in the [facility] '6 clicks' Inpatient Daily Activity Short Form.  Research supports that patients with this level of impairment have no further OT needs.  Final disposition will be made by interdisciplinary medical team.    Patient End of Session: Up in chair;Needs met;Call light within reach;RN aware of session/findings;All patient questions and concerns addressed;Family present    Occupational Profile/Medical History LOW - Brief history including review of medical or therapy records    Specific performance deficits impacting engagement in ADL/IADL LOW  1 - 3 performance deficits    Client Assessment/Performance Deficits LOW - No comorbidities nor modifications of tasks    Clinical Decision Making LOW - Analysis of occupational profile, problem-focused assessments, limited treatment options    Overall Complexity LOW   Self-Care Home Management: 15 minutes

## 2024-06-21 NOTE — PROGRESS NOTES
[facility]  part of [facility]    Progress Note    Deib Baldwin Patient Status:  Outpatient in a Bed    1965 MRN [id number]   Location [facility] Attending Rik Velazquez MD   Hosp Day # 0 PCP Yudy Michelle MD     SUBJECTIVE:  Interval History: The patient is doing well overall.  The post operative pain is well managed with the current medications.  She reports mild/moderate lumbar pain.  She denies any fever, chills, gross bowel/bladder incontinence or saddle anesthesia. They also deny calf pain, cramping, chest pain, difficulty breathing or shortness of breath. They are getting up for transfers and walking without difficulty.     Post-Op Day: 1    OBJECTIVE:  Blood pressure 108/69, pulse 82, temperature 98.2 °F (36.8 °C), temperature source Oral, resp. rate 16, height 5' 7\" (1.702 m), weight 208 lb (94.3 kg), SpO2 97%.     Ortho Spine Exam:  RLQ and posterior lumbar Incisional dressings are clean, dry and intact.  Adjacent skin is without erythema or edema.  Motor exam on left is 5/5 hip flexors & quad , 4/5 EHL/tib ant on left and 5/5 strength with RLE.  2+ dorsalis pedis and posterior tibialis pulses.  Sensation is intact distally. Calves are soft and non-tender to palpation.      ASSESSMENT/PLAN:    S/P L5-S1 ALIF with PSF POD1    1) Continue current pain management protocol  2) Plan to discharge home today pending clearance from the medicine/hospitalist team and PT/OT  3) Post operative medications were sent to patient's pharmacy outside of [facility]   4) Plan to have patient remove their dressing on POD#3  5) Follow up in clinic in 2 to 3 weeks, appointment should already be scheduled, please have patient call [phone] to confirm or change date/location.   6) All questions answered by the bedside today     AVINASH WEBB NP  2024  7:10 AM

## 2024-06-21 NOTE — DISCHARGE INSTRUCTIONS
Discharge Instructions Dr. Rik Velazquez  POSTERIOR LUMBAR INTERBODY FUSION (TLIF),  ANTERIOR LUMBAR INTERBODY FUSION (ALIF),    Wound Care  Do not change or remove your initial postoperative dressing for the first 2 days after surgery   unless instructed to do so by Dr. Velazquez's staff or if it is saturated by drainage. If removed,   please reapply a clean dry dressing over the incision. You may have thin adhesive paper strips   on your incision after surgery--please do NOT remove them. They will be removed at your 2-  week postoperative appointment. You may remove your dressing and shower on the third day   after surgery. Let warm soapy water run over the incisional area. Do not scrub the area.   Gently pat the incisional area completely dry. You may reapply a clean dry dressing over the   incision to prevent irritation from clothing, but this is not required after post op day 3. You are   not allowed to soak your incision in a bathtub, hot tub, or swimming pool until the incision is   completely healed and Dr. Velazquez or his staff permit these activities. Do not apply antibiotic   gels, ointments (Neosporin or bacitracin), lotions, peroxide or iodine solutions on or around the   Incision.  You may have some swelling and/or clear yellow drainage around your incisional site. This is   normal and may take several weeks to go away. Please leave any superficial scabs alone and let   them fall off on their own.    Immediate Follow Up  If you notice incisional redness or increased swelling, continuous clear drainage or change in   color to the drainage, malodor, significant leg swelling, increased pain and/or a fever greater   than 101.5, you should call our office. If it is after business hours you should present to the   closest emergency room. If you have shortness of breath, chest pain, significant stomach pain   and bloating without a bowel movement, complete loss of bowel or bladder function please   contact our  office and present to your local emergency room as soon as possible.    Post-Operative Instructions - Lumbar  After surgery you may experience pain in or around the region of the incision. Some buttock   and leg pain as well as tingling or numbness may also be present. Initially it may be of greater   intensity than before surgery but will usually subside over time as the healing process occurs.   This discomfort is caused from surgical retraction of tissue as well as inflammation and swelling   of previously compressed nerves.  Early activity after surgery is extremely important to help prevent the complications such as   pneumonia and blood clots. Activity also promotes recovery, relieves muscle stiffness, allows   for development of a well-organized scar, and improves your outcome. Your recovery is an   essential part of the surgical process so please follow the guidelines below to return to your   desired activities as safely as possible.     Week 1  ? Try to get at least 8 hours of sleep each night. A disrupted sleep pattern is common   after discharge from the hospital and will return to normal over time.  ? Avoid bending and twisting at the waist. No bending more than what is required to get   dressed. No twisting more than required to toilet (wipe yourself).  ? No sitting for more than 1 hour at a time. Walk and/or change position before returning   to a sitting position.   ? You may not drive, but you may be driven.  ? Begin a daily walking program with 1-2 blocks initially; schedule a daily time and   increase distance daily. Ideally, we would like you to be up and walking 15   minutes/hour.   ? Eat a balanced high-fiber diet and drink plenty of water.  ? Take medications as prescribed, using narcotics and muscle relaxants only as needed.  ? Do not restart any NSAIDs such as Advil, Motrin, Aleve, ibuprofen, naproxen, diclofenac,   meloxicam or celecoxib for at least 3 months after your fusion surgery.   ?  Take stool softeners to help with bowel movements.    Week 2  ? Resume normal rising and retiring schedule but continue to rest throughout the day as   needed.  ? You may not drive, but you may be driven.  ? No lifting of anything weighing more than 10 to 15 pounds.  ? Continue scheduled walking, increasing distance and frequency as tolerated.  ? May resume sexual relations when comfortable between 2 to 4 weeks after surgery.  Please be advised the patient must be in the top position until 6 weeks after surgery.   ? Begin weaning from narcotic pain medications if you have not already.  ? Follow-up in the office as scheduled for further instructions.     Disability  The usual period of recovery for Lumbar Spinal Fusion surgery is 8 to 12 weeks and complete   healing may take from 6 to 9 months. Some patients may return to work sooner than others   depending on their job, response to surgery, and ability to perform other lighter tasks in the   workplace. Physician approval is required prior to returning to work.    Post-Operative Pain Medication Policy  It is Dr. Velazquez's aim to make you as comfortable as possible after surgery. We realize pain   management is not perfect, and that you will have some discomfort after your operation.   There are several factors that limit our ability to completely eliminate pain after surgery. This   first is that pain medications have side effects. Please be advised that high doses or continued   use of narcotic medications may put you at risk for serious health issues including but not   limited to respiratory depression (decreased ability to breathe normally), hypotension (low   blood pressure), nausea and constipation, generalized itching, urinary retention (inability to   urinate) and abdominal distention.   Dr. Velazquez will prescribe pain medication for 2-3 weeks after surgery and may refer you out to a   pain management specialist for any narcotic medication requested after this  period of time.    Preventing Opioid Addiction  [facility] is committed to protecting our patients from Opioid addiction.   We only prescribe opioids when necessary. Whenever possible, we use less-addictive pain   medication and alternative pain management options. Both high-dosage opioids and lowdosage opioids taken over long periods of time can increase the risk of addiction. We attempt to limit our prescriptions of opioids after surgery as much as possible, which may include lower   dosages of opioid medications or fewer pills. If you have additional questions about Opioids   and their dependency, please contact our clinic.    Contact Information  Please contact Dr. Marcus Rizo’s , at [phone] with any   questions or concerns pertaining to scheduling or other administrative issues.  Please contact Skylar Coronel PA-C, Dr. Velazquez’s physician assistant, at [phone] with   any medical questions or concerns.     AFTER HOURS EMERGENCY CONTACT: Please dial [phone] and press “0”  for the  to connect you to the orthopaedic fellow or resident on call if you have any   urgent questions or concerns after regular business hours. If you do not hear back from the oncall physician in a timely matter and your urgent matter turns emergent, you should present to   your local emergency room. Please follow-up with Skylar Coronel PA-C the following business   day with an update if you do have to contact the on-call physician or present to your local   emergency room after surgery.     Future Dental Appointments  (3 Months following surgery): Prior to going to the dentist, please notify Dr. Velazquez and let your   Dentist know that you had a spinal fusion surgery. Have the treating physician prescribe   antibiotics prior to the procedure. The appropriate medications and guidelines are as follows:  ? Amoxicillin 2 grams 1 hour prior to procedure. If allergic to Penicillin,  Clindamycin 600   mg, 1 hour prior to procedure. No second doses are required following the dental   procedure

## 2024-06-24 NOTE — OPERATIVE REPORT
OPERATIVE REPORT    DATE OF SURGERY   ***    PATIENT   ***    PREOPERATIVE DIAGNOSIS    Bilateral lumbar radiculpathy with axial low back pain due to DDD at L5-S1, ***    POST OPERATIVE DIAGNOSIS   Bilateral lumbar radiculpathy with axial low back pain due to DDD at L5-S1, ***    OPERATION  1. Anterior interbody fusion L5-S1  2. Anterior instrumentation L5-S1  3. Posterior non-segmental instrumentation L5-S1  4. Posterior lumbar fusion L5-S1  5. Fluoroscopy  6. Use of microscope      SURGEON  Rik Velazquez MD    COSURGEON  Usama Edouard MD    ASSISTANT  ***    ANESTHESIA  General    EBL  50cc    COMPLICATIONS  None immediate    DRAINS  None    ACTIVITY  Up as tolerated    IMPLANT  Nuvasive Brigade  ***    *** mm screws L5  *** mm screws S1      INDICATIONS   The patient is a *** year old ***male with bilateral  lower extremity   radiculopathy. Patient failed a course of physical therapy as well   as epidural injections. He had disk degeneration at *** causing severe foraminal stenosis.  Anterior surgery with posterior fixation was discussed. The   patient understood the risks of anterior spinal surgery. Risks and benefits of the procedure was   discussed with the patient which included, but was not limited   to, large vessel injury, bleeding, infection, nerve injury, epidural hematoma,    durotomy as well as complications associated with general   anesthesia (Stroke, myocardial infarction, DVT, PE). The patient   understood/consented and was taken to the operating room. Due to the   complexity of the case, a surgical assistant was required for retraction, implant placement.     OPERATIVE FINDINGS   Degenerative *** disk space degeneration which was resected PEEK spacer was placed.       TECHNIQUE Ancef 2g was given preoperatively. Upon successful   intubation, the patient was transferred onto the regular   operating room table in a supine position. All pressure points   were well padded. The abdomen was prepped and  draped in the normal sterile fashion. A lateral fluoroscopic image was used to localize at ***   interspace by Dr. Edouard. A left sided retroperitoneal approach was used.  Following placement of the abdominal retractors at the appropriate level a   lateral fluoroscopic image was taken to confirm level. Excellent hemostasis was achieved.      After localization, an annulotomy was then made. A combination of straight and curved   curretes were used to remove the disk material and cartilaginous   end plates. Fluoroscopy was used to confirm appropriate depth of the endplate. Trial cages were placed and checked under fluoroscopy.    A PEEK intervertebral cage was then   appropriately sized. The PEEK cage was then fashioned into place   and packed with local bone graft that had been obtained from the   anterior osteophyte resection as well as allograft. The intervertebral cage was then   secured into the vertebral bodies using anterior spinal   instrumentation with screws placed into the *** vertebral   bodies measuring ***. Excellent fixation was obtained and was felt to be adequate. The wound was then copiously   irrigated. The retractors were removed and the vessels were inspected and hemostasis was   maintained. The abdominal fascia was closed with #0 looped PDS was used. 2-0 vicryl was   used or the deep dermal and 4-0 monocryl for subcuticular layer. Dermabond was applied. A sterile dressing was    applied.    The patient was then positioned prone on the Arcadio spinal frame. Care was taken to   ensure bony prominences were well padded. The lower back was   prepped and draped in the usual fashion for surgery and local   anesthetic was infiltrated into the paraspinal area. Under   fluoroscopy, an incision was made immediately lateral to the L5   and S1 pedicles under biplane fluoroscopy, a Jamshidi was   advanced from the lateral aspect of the pedicle down the pedicle   into the vertebral body. A guidewire was then  advanced down the   Jamshidi into the vertebral body under biplanar fluorosopy. This was performed in identical fashion at all pedicles. At this point, the incision   connecting the guidewire was made. This was continued through   fascia with Bovie dissection. The dilators were advanced over   the guidewires and *** mm pedicle screws at ***, ***mm pedicle screws at S1  were then advanced over the guidewire under fluoroscopy. In   addition, live EMG monitoring was performed as the screws were   Advanced. No neural stimulation was   noted. All  pedicle screws were well positioned in the same   fashion. A *** mm ranjit was then advanced down the connectors   into the heads of the pedicle screws. The set screws   were then fastened securely. The extenders were then removed and   the pedicle screw ranjit constructs was seen to be well positioned   on fluoroscopy.     The wound was thoroughly irrigated. Hemostasis was ensured.   The fascia was approximated with an 0 Vicryl suture, subcutaneous   tissue, and skin was then approximated. Sterile dressing   applied. The patient returned to recovery in stable condition.

## 2024-06-25 NOTE — OPERATIVE REPORT
[facility]  part of [facility]     Operative Report    Patient Name:  Debi Baldwin  MR:  [id number]  :  1965  DOS:  24    Preop Dx:  Lumbar radiculopathy, spinal stenosis lumbar region with neurogenic claudication  Postop Dx:  Lumbar radiculopathy, spinal stenosis lumbar region with neurogenic claudication    Procedure:    Anterior lumbar interbody fusion L5 to S1 (48945-41)  Ureterolysis (75657)  Real-time intraoperative C-arm fluoroscopy with image guidance and surgeon interpretation (53719)    Surgeons:  Usama Edouard MD, Josh Velazquez MD  PA: Skylar Coronel, STEVE, BROOKLYN  EBL: Blood Output: 25 mL (2024 12:50 PM)    Complications:  None.     Description:    The patient is a 58 year old female who is undergoing an ALIF with Dr. Josh Velazquez.  General surgery was asked to assist in exposing the anterior disc space.    The patient was taken to the OR and placed in supine position.  General anesthesia was established and the abdomen was prepped in standard fashion.  Fluoroscopy was used to yan the L5-S1 level.  A transverse incision was made over the left lower quadrant.  The anterior left rectus fascia was incised and the left rectus muscle was mobilized medially.  A vertical incision was made in the tranversalis fascia and the retroperitoneum was entered.  The peritoneum and left ureter were identified and mobilized medially.  The left ureter was freed from the retroperitoneum and left iliac vessels.  We were able to mobilize the left ureter to the right of the disc space.  Left ureterolysis was performed safely and no injury was seen.  Just medial to the left iliac vein, we dissected the prevertebral space.  The median sacral vessels were divided to further expose the L5-S1 disc space.  The retractors were placed to expose the anterior disc space.  Fluoroscopy was used to confirm the L5-S1 disc level.  The case was turned over to Dr. Velazquez for the discectomy and implant  placement.    After fluoroscopic confirmation of the implant placement, the operative field was irrigated with saline.  Adequate hemostasis was obtained.  The retractors were slowly removed and the operative field remained hemostatic.  There were no injury to the left ureter, peritoneal contents or the sympathetic chain during our dissection.  A left transversus abdominus plane block was performed using 0.5% marcaine.  The anterior fascia was closed using 0 looped PDS.  We closed the subcutaneous tissue and skin. All instrument and sponge counts were correct.  I was present to expose the anterior disc space.    Usama Edouard MD

## 2024-10-05 ENCOUNTER — LAB ENCOUNTER (OUTPATIENT)
Dept: LAB | Age: 59
End: 2024-10-05
Attending: INTERNAL MEDICINE
Payer: MEDICARE

## 2024-10-05 DIAGNOSIS — G89.4 CHRONIC PAIN SYNDROME: Primary | ICD-10-CM

## 2024-10-05 DIAGNOSIS — E78.00 PURE HYPERCHOLESTEROLEMIA: ICD-10-CM

## 2024-10-05 DIAGNOSIS — I10 ESSENTIAL HYPERTENSION, MALIGNANT: ICD-10-CM

## 2024-10-05 DIAGNOSIS — R06.09 DYSPNEA ON EXERTION: ICD-10-CM

## 2024-10-05 LAB
ALBUMIN SERPL-MCNC: 4.9 G/DL (ref 3.2–4.8)
ALBUMIN/GLOB SERPL: 1.9 {RATIO} (ref 1–2)
ALP LIVER SERPL-CCNC: 54 U/L
ALT SERPL-CCNC: 25 U/L
ANION GAP SERPL CALC-SCNC: 6 MMOL/L (ref 0–18)
AST SERPL-CCNC: 28 U/L (ref ?–34)
BASOPHILS # BLD AUTO: 0.02 X10(3) UL (ref 0–0.2)
BASOPHILS NFR BLD AUTO: 0.3 %
BILIRUB SERPL-MCNC: 0.4 MG/DL (ref 0.3–1.2)
BUN BLD-MCNC: 18 MG/DL (ref 9–23)
CALCIUM BLD-MCNC: 10.4 MG/DL (ref 8.7–10.4)
CHLORIDE SERPL-SCNC: 104 MMOL/L (ref 98–112)
CHOLEST SERPL-MCNC: 200 MG/DL (ref ?–200)
CO2 SERPL-SCNC: 28 MMOL/L (ref 21–32)
CREAT BLD-MCNC: 0.89 MG/DL
EGFRCR SERPLBLD CKD-EPI 2021: 75 ML/MIN/1.73M2 (ref 60–?)
EOSINOPHIL # BLD AUTO: 0.12 X10(3) UL (ref 0–0.7)
EOSINOPHIL NFR BLD AUTO: 2 %
ERYTHROCYTE [DISTWIDTH] IN BLOOD BY AUTOMATED COUNT: 12.6 %
FASTING PATIENT LIPID ANSWER: YES
FASTING STATUS PATIENT QL REPORTED: YES
GLOBULIN PLAS-MCNC: 2.6 G/DL (ref 2–3.5)
GLUCOSE BLD-MCNC: 94 MG/DL (ref 70–99)
HCT VFR BLD AUTO: 44.4 %
HDLC SERPL-MCNC: 54 MG/DL (ref 40–59)
HGB BLD-MCNC: 14.2 G/DL
IMM GRANULOCYTES # BLD AUTO: 0.01 X10(3) UL (ref 0–1)
IMM GRANULOCYTES NFR BLD: 0.2 %
LDLC SERPL CALC-MCNC: 125 MG/DL (ref ?–100)
LYMPHOCYTES # BLD AUTO: 2.07 X10(3) UL (ref 1–4)
LYMPHOCYTES NFR BLD AUTO: 34.6 %
MCH RBC QN AUTO: 31.1 PG (ref 26–34)
MCHC RBC AUTO-ENTMCNC: 32 G/DL (ref 31–37)
MCV RBC AUTO: 97.4 FL
MONOCYTES # BLD AUTO: 0.51 X10(3) UL (ref 0.1–1)
MONOCYTES NFR BLD AUTO: 8.5 %
NEUTROPHILS # BLD AUTO: 3.25 X10 (3) UL (ref 1.5–7.7)
NEUTROPHILS # BLD AUTO: 3.25 X10(3) UL (ref 1.5–7.7)
NEUTROPHILS NFR BLD AUTO: 54.4 %
NONHDLC SERPL-MCNC: 146 MG/DL (ref ?–130)
OSMOLALITY SERPL CALC.SUM OF ELEC: 288 MOSM/KG (ref 275–295)
PLATELET # BLD AUTO: 251 10(3)UL (ref 150–450)
POTASSIUM SERPL-SCNC: 4.2 MMOL/L (ref 3.5–5.1)
PROT SERPL-MCNC: 7.5 G/DL (ref 5.7–8.2)
RBC # BLD AUTO: 4.56 X10(6)UL
SODIUM SERPL-SCNC: 138 MMOL/L (ref 136–145)
TRIGL SERPL-MCNC: 116 MG/DL (ref 30–149)
TSI SER-ACNC: 1.8 MIU/ML (ref 0.55–4.78)
VIT D+METAB SERPL-MCNC: 56.6 NG/ML (ref 30–100)
VLDLC SERPL CALC-MCNC: 21 MG/DL (ref 0–30)
WBC # BLD AUTO: 6 X10(3) UL (ref 4–11)

## 2024-10-05 PROCEDURE — 85025 COMPLETE CBC W/AUTO DIFF WBC: CPT

## 2024-10-05 PROCEDURE — 84443 ASSAY THYROID STIM HORMONE: CPT

## 2024-10-05 PROCEDURE — 36415 COLL VENOUS BLD VENIPUNCTURE: CPT

## 2024-10-05 PROCEDURE — 80053 COMPREHEN METABOLIC PANEL: CPT

## 2024-10-05 PROCEDURE — 82306 VITAMIN D 25 HYDROXY: CPT

## 2024-10-05 PROCEDURE — 80061 LIPID PANEL: CPT

## (undated) DEVICE — GLOVE SUR 7 SENSICARE PI MIC PIP CRM PWD F

## (undated) DEVICE — KIT SUR ACCS MAXCESS

## (undated) DEVICE — KIT PATIENT CARE SPINAL TABLE

## (undated) DEVICE — GOWN SURG AERO BLUE PERF LG

## (undated) DEVICE — SUT VICRYL 3-0 SH J416H

## (undated) DEVICE — C-ARMOR C-ARM EQUIPMENT COVERS CLEAR STERILE UNIVERSAL FIT 12 PER CASE: Brand: C-ARMOR

## (undated) DEVICE — Device: Brand: STABLECUT®

## (undated) DEVICE — ANTIBACTERIAL VIOLET BRAIDED (POLYGLACTIN 910), SYNTHETIC ABSORBABLE SUTURE: Brand: COATED VICRYL

## (undated) DEVICE — NON-ADHERENT DRESSING: Brand: TELFA

## (undated) DEVICE — SPONGE: SPECIALTY PEANUT XR 100/CS: Brand: MEDICAL ACTION INDUSTRIES

## (undated) DEVICE — WRAP COOLING BACK W/NO PILLOW

## (undated) DEVICE — Device: Brand: JELCO

## (undated) DEVICE — GAUZE SPONGES,12 PLY: Brand: CURITY

## (undated) DEVICE — SUT PERMA- 2-0 30IN NABSRB BLK TIE SILK

## (undated) DEVICE — PENCIL ES BTTN SWCH W/ TIP HOLSTER E-Z CLN

## (undated) DEVICE — ANTERIOR POSTERIOR ADD ON PACK: Brand: MEDLINE INDUSTRIES, INC.

## (undated) DEVICE — SPONGE LAP 18X18IN WHT COT 4 PLY FLD STRUNG

## (undated) DEVICE — C-ARM: Brand: UNBRANDED

## (undated) DEVICE — GAMMEX® PI HYBRID SIZE 6.5, STERILE POWDER-FREE SURGICAL GLOVE, POLYISOPRENE AND NEOPRENE BLEND: Brand: GAMMEX

## (undated) DEVICE — ELECTRODE ES 2.75IN PTFE BLDE MOD E-Z CLN

## (undated) DEVICE — VIOLET BRAIDED (POLYGLACTIN 910), SYNTHETIC ABSORBABLE SUTURE: Brand: COATED VICRYL

## (undated) DEVICE — WRAP THRP PLRCR500 BCK DRSG

## (undated) DEVICE — SPONGE PREMIERPRO 7X18X18

## (undated) DEVICE — 1010 S-DRAPE TOWEL DRAPE 10/BX: Brand: STERI-DRAPE™

## (undated) DEVICE — SPK10329 JACKSON KIT: Brand: SPK10329 JACKSON KIT

## (undated) DEVICE — CLOSURE EXOFIN 1.0ML

## (undated) DEVICE — DRAPE SHEET LARGE 76X55

## (undated) DEVICE — TOTAL HIP: Brand: MEDLINE INDUSTRIES, INC.

## (undated) DEVICE — GAMMEX® PI HYBRID SIZE 7.5, STERILE POWDER-FREE SURGICAL GLOVE, POLYISOPRENE AND NEOPRENE BLEND: Brand: GAMMEX

## (undated) DEVICE — GOWN,SIRUS,FAB REINF,RAGLAN,XL,STERILE: Brand: MEDLINE

## (undated) DEVICE — SHEET,DRAPE,70X100,STERILE: Brand: MEDLINE

## (undated) DEVICE — SUT COAT VCRL+ 0 27IN UR-6 ABSRB VLT ANTIBACT

## (undated) DEVICE — GAUZE TRAY STERILE 4X4 12PLY

## (undated) DEVICE — SPINOCAN® 18 GA. X 3-1/2 IN. (90 MM) SPINAL NEEDLE: Brand: SPINOCAN®

## (undated) DEVICE — INTENDED USE FOR SURGICAL MARKING ON INTACT SKIN, ALSO PROVIDES A PERMANENT METHOD OF IDENTIFYING OBJECTS IN THE OPERATING ROOM: Brand: WRITESITE® PLUS MINI PREP RESISTANT MARKER

## (undated) DEVICE — FLOSEAL HEMOSTATIC MATRIX, 5ML: Brand: FLOSEAL HEMOSTATIC MATRIX

## (undated) DEVICE — 3M™ IOBAN™ 2 ANTIMICROBIAL INCISE DRAPE 6650EZ: Brand: IOBAN™ 2

## (undated) DEVICE — FLEXIBLE YANKAUER,MEDIUM TIP, NO VACUUM CONTROL: Brand: ARGYLE

## (undated) DEVICE — PILLOW FX 56X38X15CM MED HIP

## (undated) DEVICE — 3.0MM PRECISION NEURO (MATCH HEAD)

## (undated) DEVICE — SHEET,DRAPE,53X77,STERILE: Brand: MEDLINE

## (undated) DEVICE — BANDAGE COMP PREMPRO 5YDX4IN

## (undated) DEVICE — APPLICATOR CHLORAPREP 26ML

## (undated) DEVICE — SUT MONOCRYL 3-0 PS-1 Y936H

## (undated) DEVICE — 3M™ TEGADERM™ TRANSPARENT FILM DRESSING, 1626W, 4 IN X 4-3/4 IN (10 CM X 12 CM), 50 EACH/CARTON, 4 CARTON/CASE: Brand: 3M™ TEGADERM™

## (undated) DEVICE — SUT COAT VCRL 2-0 18IN CT-1 ABSRB UD CR 36MM

## (undated) DEVICE — SUT PDS II 4-0 27IN SH ABSRB VLT L26MM 1/2

## (undated) DEVICE — COVER BK TBL L72IN BLU PD HVY DTY BK TBL CVR

## (undated) DEVICE — LEG POSITIONER DISPOSABLE KIT

## (undated) DEVICE — 3M™ STERI-DRAPE™ U-DRAPE 1015: Brand: STERI-DRAPE™

## (undated) DEVICE — SPONGE GZ 4X4IN COT 12 PLY TYP VII WVN C

## (undated) DEVICE — GAMMEX® PI HYBRID SIZE 8, STERILE POWDER-FREE SURGICAL GLOVE, POLYISOPRENE AND NEOPRENE BLEND: Brand: GAMMEX

## (undated) DEVICE — SLEEVE KENDALL SCD EXPRESS MED

## (undated) DEVICE — GLOVE SUR 6.5 SENSICARE PI PIP GRN PWD F

## (undated) DEVICE — GOWN,SIRUS,FAB REINF,RAGLAN,L,STERILE: Brand: MEDLINE

## (undated) DEVICE — HOOD: Brand: FLYTE

## (undated) DEVICE — E-Z CLEAN, NON-STICK, PTFE COATED, ELECTROSURGICAL BLADE ELECTRODE, MODIFIED EXTENDED INSULATION, 6.5 INCH (16.5 CM): Brand: MEGADYNE

## (undated) DEVICE — LIGACLIP MCA MULTIPLE CLIP APPLIERS, 20 MEDIUM CLIPS: Brand: LIGACLIP

## (undated) DEVICE — GAMMEX® PI HYBRID SIZE 8.5, STERILE POWDER-FREE SURGICAL GLOVE, POLYISOPRENE AND NEOPRENE BLEND: Brand: GAMMEX

## (undated) DEVICE — 60 ML SYRINGE LUER-LOCK TIP: Brand: MONOJECT

## (undated) DEVICE — SNAP KOVER: Brand: UNBRANDED

## (undated) DEVICE — GLOVE SUR 6.5 SENSICARE PI MIC PIP CRM PWD F

## (undated) DEVICE — OUTPATIENT: Brand: MEDLINE INDUSTRIES, INC.

## (undated) DEVICE — KIT HEMSTAT MTRX 8ML PORCINE GEL HUM THROM

## (undated) DEVICE — NEEDLE NRV STIM BVL TIP INSUL PEDCL ACCS SYS

## (undated) DEVICE — DRAPE,ABDOMINAL,MAJOR,STERILE: Brand: MEDLINE

## (undated) DEVICE — DRAPE SHEET LAPAROTOMY

## (undated) DEVICE — GLOVE SUR 8.5 SENSICARE PI MIC PIP CRM PWD F

## (undated) DEVICE — PEN: MARKING STD PT 100/CS: Brand: MEDICAL ACTION INDUSTRIES

## (undated) DEVICE — DRAPE SRG 70X60IN SPLT U IMPRV

## (undated) DEVICE — PACK CDS LAMINECTOMY

## (undated) DEVICE — 3M™ TEGADERM™ TRANSPARENT FILM DRESSING FRAME STYLE, 1626W, 4 IN X 4-3/4 IN (10 CM X 12 CM), 50/CT 4CT/CASE: Brand: 3M™ TEGADERM™

## (undated) DEVICE — SUT PDS II 0 L60IN ABSRB VLT L48MM CTX 1/2

## (undated) DEVICE — SUT VICRYL 2-0 CT-2 J269H

## (undated) DEVICE — TOWEL SURG OR 17X30IN BLUE

## (undated) DEVICE — MONITORING NEUROPHYSIOLOGICAL

## (undated) DEVICE — SUT MONOCRYL 3-0 PS-2 Y497G

## (undated) DEVICE — ANTIBACTERIAL UNDYED BRAIDED (POLYGLACTIN 910), SYNTHETIC ABSORBABLE SUTURE: Brand: COATED VICRYL

## (undated) DEVICE — SOL NACL IRRIG 0.9% 1000ML BTL

## (undated) DEVICE — ADHESIVE SKIN TOP FOR WND CLSR DERMBND ADV

## (undated) DEVICE — TRAY INSTR PWR NUVASIVE

## (undated) DEVICE — DRESSING AQUACEL AG SP 3.5X10

## (undated) DEVICE — DISPOSABLE SLIM BIPOLAR FORCEPS, NON-STICK,: Brand: SPETZLER-MALIS

## (undated) DEVICE — SOLUTION  .9 3000ML

## (undated) DEVICE — Device

## (undated) DEVICE — SUT MCRYL 3-0 18IN PS-2 ABSRB UD 19MM 3/8 CIR

## (undated) DEVICE — TRAY CATH FOLEY 16FR INCLUDE BARDX IC COMPLT CARE

## (undated) DEVICE — SOLUTION IRRIG 1000ML 0.9% NACL USP BTL

## (undated) DEVICE — TUBING MEGADYNE SPECULUM

## (undated) DEVICE — PENCIL SMK EVAC L10FT MPLR BLDE JAW OPN

## (undated) DEVICE — MEGADYNE EZ CLEAN BLADE 2.75IN

## (undated) DEVICE — SYRINGE 35ML LL TIP

## (undated) NOTE — LETTER
[place] [address], [place], [place]    Authorization for Surgical Operation and Procedure                               I hereby authorize Rik Velazquez MD,  Usama Edouard MD, my physician and his/her assistants (if applicable), which may include medical students, residents, and/or fellows, to perform the following surgical operation/ procedure and administer such anesthesia as may be determined necessary by my physician: Operation/Procedure name (s) L5-S1 anterior lumbar interbody fusion with possible posterior spinal fusion, possible laminectomy on Debi Baldwin   2.   I recognize that during the surgical operation/procedure, unforeseen conditions may necessitate additional or different procedures than those listed above.  I, therefore, further authorize and request that the above-named surgeon, assistants, or designees perform such procedures as are, in their judgment, necessary and desirable.    3.   My surgeon/physician has discussed prior to my surgery the potential benefits, risks and side effects of this procedure; the likelihood of achieving goals; and potential problems that might occur during recuperation.  They also discussed reasonable alternatives to the procedure, including risks, benefits, and side effects related to the alternatives and risks related to not receiving this procedure.  I have had all my questions answered and I acknowledge that no guarantee has been made as to the result that may be obtained.    4.   Should the need arise during my operation/procedure, which includes change of level of care prior to discharge, I also consent to the administration of blood and/or blood products.  Further, I understand that despite careful testing and screening of blood or blood products by collecting agencies, I may still be subject to ill effects as a result of receiving a blood transfusion and/or blood products.  The following are some, but not all, of the potential risks  that can occur: fever and allergic reactions, hemolytic reactions, transmission of diseases such as Hepatitis, AIDS and Cytomegalovirus (CMV) and fluid overload.  In the event that I wish to have an autologous transfusion of my own blood, or a directed donor transfusion, I will discuss this with my physician.  Check only if Refusing Blood or Blood Products  I understand refusal of blood or blood products as deemed necessary by my physician may have serious consequences to my condition to include possible death. I hereby assume responsibility for my refusal and release the hospital, its personnel, and my physicians from any responsibility for the consequences of my refusal.    o  Refuse   5.   I authorize the use of any specimen, organs, tissues, body parts or foreign objects that may be removed from my body during the operation/procedure for diagnosis, research or teaching purposes and their subsequent disposal by hospital authorities.  I also authorize the release of specimen test results and/or written reports to my treating physician on the hospital medical staff or other referring or consulting physicians involved in my care, at the discretion of the Pathologist or my treating physician.    6.   I consent to the photographing or videotaping of the operations or procedures to be performed, including appropriate portions of my body for medical, scientific, or educational purposes, provided my identity is not revealed by the pictures or by descriptive texts accompanying them.  If the procedure has been photographed/videotaped, the surgeon will obtain the original picture, image, videotape or CD.  The hospital will not be responsible for storage, release or maintenance of the picture, image, tape or CD.    7.   I consent to the presence of a  or observers in the operating room as deemed necessary by my physician or their designees.    8.   I recognize that in the event my procedure results in  extended X-Ray/fluoroscopy time, I may develop a skin reaction.    9. If I have a Do Not Attempt Resuscitation (DNAR) order in place, that status will be suspended while in the operating room, procedural suite, and during the recovery period unless otherwise explicitly stated by me (or a person authorized to consent on my behalf). The surgeon or my attending physician will determine when the applicable recovery period ends for purposes of reinstating the DNAR order.  10. Patients having a sterilization procedure: I understand that if the procedure is successful the results will be permanent and it will therefore be impossible for me to inseminate, conceive, or bear children.  I also understand that the procedure is intended to result in sterility, although the result has not been guaranteed.   11. I acknowledge that my physician has explained sedation/analgesia administration to me including the risk and benefits I consent to the administration of sedation/analgesia as may be necessary or desirable in the judgment of my physician.    I CERTIFY THAT I HAVE READ AND FULLY UNDERSTAND THE ABOVE CONSENT TO OPERATION and/or OTHER PROCEDURE.     ____________________________________  _________________________________        ______________________________  Signature of Patient    Signature of Responsible Person                Printed Name of Responsible Person                                      ____________________________________  _____________________________                ________________________________  Signature of Witness        Date  Time         Relationship to Patient    STATEMENT OF PHYSICIAN My signature below affirms that prior to the time of the procedure; I have explained to the patient and/or his/her legal representative, the risks and benefits involved in the proposed treatment and any reasonable alternative to the proposed treatment. I have also explained the risks and benefits involved in refusal of the  proposed treatment and alternatives to the proposed treatment and have answered the patient's questions. If I have a significant financial interest in a co-management agreement or a significant financial interest in any product or implant, or other significant relationship used in this procedure/surgery, I have disclosed this and had a discussion with my patient.     _____________________________________________________              _____________________________  (Signature of Physician)                                                                                         (Date)                                   (Time)  Patient Name: Debi Baldwin      : 1965      Printed: 2024     Medical Record #: [id number]                                      Page 1 of 1